# Patient Record
Sex: MALE | Race: WHITE
[De-identification: names, ages, dates, MRNs, and addresses within clinical notes are randomized per-mention and may not be internally consistent; named-entity substitution may affect disease eponyms.]

---

## 2017-08-08 ENCOUNTER — HOSPITAL ENCOUNTER (OUTPATIENT)
Dept: HOSPITAL 6 - LAB | Age: 47
End: 2017-08-08
Attending: FAMILY MEDICINE
Payer: MEDICARE

## 2017-08-08 VITALS
DIASTOLIC BLOOD PRESSURE: 90 MMHG | SYSTOLIC BLOOD PRESSURE: 136 MMHG | SYSTOLIC BLOOD PRESSURE: 136 MMHG | DIASTOLIC BLOOD PRESSURE: 90 MMHG | DIASTOLIC BLOOD PRESSURE: 90 MMHG | SYSTOLIC BLOOD PRESSURE: 136 MMHG

## 2017-08-08 VITALS — BODY MASS INDEX: 45.22 KG/M2 | HEIGHT: 69.02 IN | WEIGHT: 305.34 LBS

## 2017-08-08 DIAGNOSIS — R22.42: ICD-10-CM

## 2017-08-08 DIAGNOSIS — M79.662: Primary | ICD-10-CM

## 2017-08-08 DIAGNOSIS — R79.1: ICD-10-CM

## 2017-08-09 ENCOUNTER — HOSPITAL ENCOUNTER (OUTPATIENT)
Dept: HOSPITAL 6 - RAD | Age: 47
End: 2017-08-09
Attending: FAMILY MEDICINE
Payer: MEDICARE

## 2017-08-09 DIAGNOSIS — R79.1: Primary | ICD-10-CM

## 2017-08-09 DIAGNOSIS — M79.89: ICD-10-CM

## 2019-10-21 ENCOUNTER — HOSPITAL ENCOUNTER (OUTPATIENT)
Dept: HOSPITAL 6 - MSO | Age: 49
End: 2019-10-21
Payer: MEDICARE

## 2019-10-21 DIAGNOSIS — Z12.11: Primary | ICD-10-CM

## 2019-10-21 DIAGNOSIS — E66.9: ICD-10-CM

## 2019-10-21 DIAGNOSIS — I10: ICD-10-CM

## 2020-07-06 ENCOUNTER — HOSPITAL ENCOUNTER (EMERGENCY)
Dept: HOSPITAL 6 - ED | Age: 50
Discharge: HOME | End: 2020-07-06
Payer: MEDICARE

## 2020-07-06 VITALS — SYSTOLIC BLOOD PRESSURE: 161 MMHG | DIASTOLIC BLOOD PRESSURE: 94 MMHG

## 2020-07-06 DIAGNOSIS — T63.461A: Primary | ICD-10-CM

## 2020-07-06 DIAGNOSIS — Z23: ICD-10-CM

## 2020-07-06 DIAGNOSIS — I10: ICD-10-CM

## 2022-01-07 ENCOUNTER — HOSPITAL ENCOUNTER (EMERGENCY)
Dept: HOSPITAL 6 - ED | Age: 52
LOS: 1 days | Discharge: ANOTHER HOSPITAL | End: 2022-01-08
Payer: MEDICARE

## 2022-01-07 ENCOUNTER — HOSPITAL ENCOUNTER (INPATIENT)
Dept: HOSPITAL 19 - MEDICAL | Age: 52
LOS: 18 days | Discharge: NURSING, REHAB OR LONG-TERM CARE | DRG: 207 | End: 2022-01-25
Attending: STUDENT IN AN ORGANIZED HEALTH CARE EDUCATION/TRAINING PROGRAM | Admitting: STUDENT IN AN ORGANIZED HEALTH CARE EDUCATION/TRAINING PROGRAM
Payer: MEDICARE

## 2022-01-07 VITALS — WEIGHT: 315 LBS | BODY MASS INDEX: 46.65 KG/M2 | HEIGHT: 69.02 IN

## 2022-01-07 VITALS — HEIGHT: 69.02 IN | BODY MASS INDEX: 46.65 KG/M2 | WEIGHT: 315 LBS

## 2022-01-07 DIAGNOSIS — R05.9: Primary | ICD-10-CM
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
## 2022-01-07 DIAGNOSIS — D64.9: ICD-10-CM

## 2022-01-07 DIAGNOSIS — N39.0: ICD-10-CM

## 2022-01-07 DIAGNOSIS — Z73.0: ICD-10-CM

## 2022-01-07 DIAGNOSIS — U07.1: Primary | ICD-10-CM

## 2022-01-07 DIAGNOSIS — G40.909: ICD-10-CM

## 2022-01-07 DIAGNOSIS — E87.6: ICD-10-CM

## 2022-01-07 DIAGNOSIS — J12.82: ICD-10-CM

## 2022-01-07 DIAGNOSIS — J96.01: ICD-10-CM

## 2022-01-07 DIAGNOSIS — E66.01: ICD-10-CM

## 2022-01-07 DIAGNOSIS — J96.02: ICD-10-CM

## 2022-01-07 DIAGNOSIS — E87.5: ICD-10-CM

## 2022-01-07 DIAGNOSIS — N17.0: ICD-10-CM

## 2022-01-07 DIAGNOSIS — E87.0: ICD-10-CM

## 2022-01-07 DIAGNOSIS — E87.2: ICD-10-CM

## 2022-01-07 DIAGNOSIS — T82.49XA: ICD-10-CM

## 2022-01-07 DIAGNOSIS — I10: ICD-10-CM

## 2022-01-07 LAB
ALBUMIN SERPL-MCNC: 3.5 G/DL (ref 3.5–5)
ALT SERPL-CCNC: 27 U/L (ref 0–55)
AST SERPL-CCNC: 55 U/L (ref 5–34)
BASOPHILS # BLD: 0 K/MM3 (ref 0.02–0.1)
BILIRUB SERPL-MCNC: 0.4 MG/DL (ref 0.2–1.2)
CALCIUM SERPL-MCNC: 8.6 MG/DL (ref 8.3–10.5)
CO2 SERPL-SCNC: 29 MMOL/L (ref 22–29)
EOSINOPHIL # BLD: 0 K/MM3 (ref 0.04–0.4)
EOSINOPHIL NFR BLD: 0 % (ref 0–4)
ERYTHROCYTE [DISTWIDTH] IN BLOOD BY AUTOMATED COUNT: 13.6 % (ref 11.5–14.5)
GLUCOSE SERPL-MCNC: 130 MG/DL (ref 75–110)
HCT VFR BLD AUTO: 41.7 % (ref 42–52)
HGB BLD-MCNC: 13.5 G/DL (ref 13.5–18)
LYMPHOCYTES # BLD: 1.12 K/MM3 (ref 1.5–4)
MCH RBC QN AUTO: 28 PG (ref 27–31)
MCHC RBC AUTO-ENTMCNC: 32 G/DL (ref 33–37)
MCV RBC AUTO: 86 FL (ref 78–100)
MONOCYTES # BLD: 0.31 K/MM3 (ref 0.2–0.8)
NEUTROPHILS # BLD: 3.19 K/MM3 (ref 1.4–6.5)
PLATELET # BLD AUTO: 124 K/MM3 (ref 130–400)
PMV BLD AUTO: 9.3 FL (ref 7.4–10.4)
POTASSIUM SERPL-SCNC: 3.3 MMOL/L (ref 3.5–5.1)
PROT SERPL-MCNC: 7.8 G/DL (ref 6.4–8.3)
RBC # BLD AUTO: 4.88 M/MM3 (ref 4.2–5.6)
SODIUM SERPL-SCNC: 134 MMOL/L (ref 136–145)
WBC # BLD AUTO: 4.6 K/MM3 (ref 4.8–10.8)

## 2022-01-07 PROCEDURE — A7521 TRACH/LARYN TUBE CUFFED: HCPCS

## 2022-01-07 PROCEDURE — A4314 CATH W/DRAINAGE 2-WAY LATEX: HCPCS

## 2022-01-07 PROCEDURE — C1751 CATH, INF, PER/CENT/MIDLINE: HCPCS

## 2022-01-08 VITALS — DIASTOLIC BLOOD PRESSURE: 69 MMHG | SYSTOLIC BLOOD PRESSURE: 148 MMHG | HEART RATE: 97 BPM | TEMPERATURE: 98.2 F

## 2022-01-08 VITALS — SYSTOLIC BLOOD PRESSURE: 118 MMHG | DIASTOLIC BLOOD PRESSURE: 88 MMHG

## 2022-01-08 VITALS — HEART RATE: 93 BPM | SYSTOLIC BLOOD PRESSURE: 140 MMHG | TEMPERATURE: 98 F | DIASTOLIC BLOOD PRESSURE: 68 MMHG

## 2022-01-08 VITALS — TEMPERATURE: 99 F | DIASTOLIC BLOOD PRESSURE: 80 MMHG | SYSTOLIC BLOOD PRESSURE: 143 MMHG | HEART RATE: 92 BPM

## 2022-01-08 VITALS — HEART RATE: 92 BPM | SYSTOLIC BLOOD PRESSURE: 142 MMHG | DIASTOLIC BLOOD PRESSURE: 87 MMHG | TEMPERATURE: 97.3 F

## 2022-01-08 VITALS — DIASTOLIC BLOOD PRESSURE: 71 MMHG | TEMPERATURE: 98.2 F | HEART RATE: 92 BPM | SYSTOLIC BLOOD PRESSURE: 133 MMHG

## 2022-01-08 VITALS — HEART RATE: 70 BPM | SYSTOLIC BLOOD PRESSURE: 167 MMHG | DIASTOLIC BLOOD PRESSURE: 66 MMHG | TEMPERATURE: 98.4 F

## 2022-01-08 VITALS — TEMPERATURE: 99.1 F | HEART RATE: 93 BPM | DIASTOLIC BLOOD PRESSURE: 62 MMHG | SYSTOLIC BLOOD PRESSURE: 126 MMHG

## 2022-01-08 LAB
ALBUMIN SERPL-MCNC: 3.1 GM/DL (ref 3.5–5)
ALP SERPL-CCNC: 60 U/L (ref 40–150)
ALT SERPL-CCNC: 25 U/L (ref 0–55)
ANION GAP SERPL CALC-SCNC: 13 MMOL/L (ref 7–16)
AST SERPL-CCNC: 54 U/L (ref 5–34)
BASOPHILS # BLD: 0 K/MM3 (ref 0–0.2)
BASOPHILS NFR BLD AUTO: 0.2 % (ref 0–2)
BILIRUB SERPL-MCNC: 0.4 MG/DL (ref 0.2–1.2)
BUN SERPL-MCNC: 16 MG/DL (ref 8–26)
CALCIUM SERPL-MCNC: 8.5 MG/DL (ref 8.4–10.2)
CHLORIDE SERPL-SCNC: 95 MMOL/L (ref 98–107)
CO2 SERPL-SCNC: 31 MMOL/L (ref 22–29)
CREAT SERPL-SCNC: 0.9 MG/DL (ref 0.72–1.25)
EOSINOPHIL # BLD: 0 K/MM3 (ref 0–0.7)
EOSINOPHIL NFR BLD: 0 % (ref 0–4)
ERYTHROCYTE [DISTWIDTH] IN BLOOD BY AUTOMATED COUNT: 13.9 % (ref 11.5–14.5)
GLUCOSE SERPL-MCNC: 119 MG/DL (ref 70–99)
GRANULOCYTES # BLD AUTO: 68.9 % (ref 42.2–75.2)
HCT VFR BLD AUTO: 42.7 % (ref 42–52)
HGB BLD-MCNC: 13.4 G/DL (ref 13.5–18)
LYMPHOCYTES # BLD: 1.3 K/MM3 (ref 1.2–3.4)
LYMPHOCYTES NFR BLD: 24 % (ref 20–51)
MAGNESIUM SERPL-MCNC: 2.4 MG/DL (ref 1.6–2.6)
MCH RBC QN AUTO: 28 PG (ref 27–31)
MCHC RBC AUTO-ENTMCNC: 31 G/DL (ref 33–37)
MCV RBC AUTO: 88 FL (ref 80–100)
MONOCYTES # BLD: 0.3 K/MM3 (ref 0.1–0.6)
MONOCYTES NFR BLD AUTO: 6 % (ref 1.7–9.3)
NEUTROPHILS # BLD: 3.8 K/MM3 (ref 1.4–6.5)
PLATELET # BLD AUTO: 142 K/MM3 (ref 130–400)
PMV BLD AUTO: 9.9 FL (ref 7.4–10.4)
POTASSIUM SERPL-SCNC: 3.8 MMOL/L (ref 3.5–4.5)
PROT SERPL-MCNC: 7.7 GM/DL (ref 6.2–8.1)
RBC # BLD AUTO: 4.85 M/MM3 (ref 4.2–5.6)
SODIUM SERPL-SCNC: 139 MMOL/L (ref 136–145)

## 2022-01-08 PROCEDURE — XW033E5 INTRODUCTION OF REMDESIVIR ANTI-INFECTIVE INTO PERIPHERAL VEIN, PERCUTANEOUS APPROACH, NEW TECHNOLOGY GROUP 5: ICD-10-PCS | Performed by: STUDENT IN AN ORGANIZED HEALTH CARE EDUCATION/TRAINING PROGRAM

## 2022-01-08 NOTE — NUR
Patient assessed around 2015. Denies pain and discomfort. On oxygen at 6 L/min
via NC. Peripheral INT to left AC, on IV ABX. Voices no questions, needs, or
concerns at this time. In bed with call light within reach.

## 2022-01-08 NOTE — NUR
Patient had received IV ABX at [facility]. Called pharmacy and adjusted
medications accordingly. Patient denies pain and discomfort. Denies SOB and
dyspnea at rest, but does have dyspnea with exertion. Continues on oxygen at 6
L/min via NC. Voices no questions, needs, or concerns at this time. In bed
with call light within reach.

## 2022-01-08 NOTE — NUR
Pt. is on 6L O2 NC. O2 sat is 94%. Pt. resting in the chair at this time,
slightly drowsy, snoring noted. Pt. denies pain. AM abx have finished. Call
light and belongings in reach. Pt. waiting for lunch to arrive.

## 2022-01-08 NOTE — NUR
Patient arrived from [facility] via EMS at approximately 0010. Called
KERRI Maddox.

## 2022-01-08 NOTE — NUR
AM assessment complete. Pt. encouraged to brush teeth four times a day. Pt. is
on 6L O2 NC, O2 sat is 90-92%. Shallow breathing noted. Ice water provided,
pt. denies further needs at this time. Call light and belongings in reach. Pt.
reports coughing up sputum, this RN encouraged pt. to spit it out each time.

## 2022-01-08 NOTE — NUR
Pt. reports he has been expectorating thick brown mucus off and on throughout
the day. Pt. was given PRN robitussin for cough this afternoon, effect
pending. Pt. denies needs at this time, call light and belongings in reach.

## 2022-01-08 NOTE — NUR
NIYA called patient's niece to complete intake documentation Chantel
[phone]. Niece states that patient currently stays with her, he does not
utilize any DME and is pretty self sufficient she states. She provides that
she does not know who his PCP is, but uses [facility] for his pharmacy. Niece
provides that he does not have any one appointed as his DPOA-HC that she is
aware of. She also provides that as far as she knows his plan is to return
home up on dc. NIYA will continue to follow.
 
DC plan: home

## 2022-01-09 VITALS — HEART RATE: 81 BPM | TEMPERATURE: 98.1 F | DIASTOLIC BLOOD PRESSURE: 73 MMHG | SYSTOLIC BLOOD PRESSURE: 120 MMHG

## 2022-01-09 VITALS — SYSTOLIC BLOOD PRESSURE: 125 MMHG | HEART RATE: 87 BPM | DIASTOLIC BLOOD PRESSURE: 74 MMHG | TEMPERATURE: 98.1 F

## 2022-01-09 VITALS — DIASTOLIC BLOOD PRESSURE: 74 MMHG | HEART RATE: 79 BPM | SYSTOLIC BLOOD PRESSURE: 141 MMHG | TEMPERATURE: 98.1 F

## 2022-01-09 VITALS — TEMPERATURE: 97.8 F | HEART RATE: 86 BPM | SYSTOLIC BLOOD PRESSURE: 131 MMHG | DIASTOLIC BLOOD PRESSURE: 80 MMHG

## 2022-01-09 VITALS — SYSTOLIC BLOOD PRESSURE: 143 MMHG | DIASTOLIC BLOOD PRESSURE: 70 MMHG | TEMPERATURE: 98.6 F | HEART RATE: 94 BPM

## 2022-01-09 VITALS — SYSTOLIC BLOOD PRESSURE: 134 MMHG | TEMPERATURE: 98.4 F | HEART RATE: 91 BPM | DIASTOLIC BLOOD PRESSURE: 60 MMHG

## 2022-01-09 LAB
ANION GAP SERPL CALC-SCNC: 13 MMOL/L (ref 7–16)
BUN SERPL-MCNC: 15 MG/DL (ref 8–26)
CALCIUM SERPL-MCNC: 8.6 MG/DL (ref 8.4–10.2)
CHLORIDE SERPL-SCNC: 95 MMOL/L (ref 98–107)
CO2 SERPL-SCNC: 27 MMOL/L (ref 22–29)
CREAT SERPL-SCNC: 0.83 MG/DL (ref 0.72–1.25)
ERYTHROCYTE [DISTWIDTH] IN BLOOD BY AUTOMATED COUNT: 13.8 % (ref 11.5–14.5)
GLUCOSE SERPL-MCNC: 209 MG/DL (ref 70–99)
HCT VFR BLD AUTO: 40.9 % (ref 42–52)
HGB BLD-MCNC: 12.9 G/DL (ref 13.5–18)
HYPOCHROMIA BLD QL SMEAR: (no result)
LYMPHOCYTES NFR BLD MANUAL: 26 % (ref 20–51)
MCH RBC QN AUTO: 27 PG (ref 27–31)
MCHC RBC AUTO-ENTMCNC: 32 G/DL (ref 33–37)
MCV RBC AUTO: 86 FL (ref 80–100)
MONOCYTES NFR BLD: 5 % (ref 1.7–9.3)
NEUTS SEG NFR BLD MANUAL: 69 % (ref 42–75.2)
PLATELET # BLD AUTO: 169 K/MM3 (ref 130–400)
PLATELET BLD QL SMEAR: NORMAL
PMV BLD AUTO: 9.7 FL (ref 7.4–10.4)
POTASSIUM SERPL-SCNC: 3.4 MMOL/L (ref 3.5–4.5)
RBC # BLD AUTO: 4.76 M/MM3 (ref 4.2–5.6)
SODIUM SERPL-SCNC: 135 MMOL/L (ref 136–145)

## 2022-01-09 NOTE — NUR
Patient has denied having pain and discomfort. Voices no questions, needs, or
concerns at this time. Continues on oxygen at 6 L/min via NC.

## 2022-01-09 NOTE — NUR
THE PATIENT HAS SOME YEAST LOOKING REDNESS UNDER THE RIGHT ARM. WILL ASK DR. MOTT FOR SOME NISTATIN CREAM. ALSO THE PATIENT'S BOWELS ARE HYPOACTIVE AND
THE PATIENT MAY NEED TO START A BOWEL REGIMEN.

## 2022-01-09 NOTE — NUR
PT DENIES ANY ISSUES AT THIS TIME. STATES THAT HE IS FEELING MUCH BETTER. IS
ABLE TO TELL ME ABOUT HIMSELF, WHERE HE IS FROM. A&OX4. PT IS INDEPENDENT,
HOWEVER, WAITS UNTIL HIS IV ABX ARE COMPLETE BEFORE GETTING UP. HE DENIES ANY
NEEDS. PT EATING BREAKFAST WELL. NO OTHER CONCERNS.

## 2022-01-10 VITALS — DIASTOLIC BLOOD PRESSURE: 95 MMHG | HEART RATE: 81 BPM | SYSTOLIC BLOOD PRESSURE: 118 MMHG | TEMPERATURE: 97.9 F

## 2022-01-10 VITALS — HEART RATE: 86 BPM | TEMPERATURE: 97.5 F | DIASTOLIC BLOOD PRESSURE: 92 MMHG | SYSTOLIC BLOOD PRESSURE: 130 MMHG

## 2022-01-10 VITALS — HEART RATE: 81 BPM | SYSTOLIC BLOOD PRESSURE: 121 MMHG | DIASTOLIC BLOOD PRESSURE: 78 MMHG | TEMPERATURE: 97.2 F

## 2022-01-10 VITALS — SYSTOLIC BLOOD PRESSURE: 127 MMHG | HEART RATE: 86 BPM | TEMPERATURE: 97.7 F | DIASTOLIC BLOOD PRESSURE: 79 MMHG

## 2022-01-10 VITALS — SYSTOLIC BLOOD PRESSURE: 128 MMHG | TEMPERATURE: 97.9 F | DIASTOLIC BLOOD PRESSURE: 64 MMHG | HEART RATE: 75 BPM

## 2022-01-10 VITALS — TEMPERATURE: 97.6 F | SYSTOLIC BLOOD PRESSURE: 123 MMHG | HEART RATE: 81 BPM | DIASTOLIC BLOOD PRESSURE: 77 MMHG

## 2022-01-10 LAB
ALBUMIN SERPL-MCNC: 3 GM/DL (ref 3.5–5)
ALP SERPL-CCNC: 66 U/L (ref 40–150)
ALT SERPL-CCNC: 29 U/L (ref 0–55)
ANION GAP SERPL CALC-SCNC: 14 MMOL/L (ref 7–16)
AST SERPL-CCNC: 37 U/L (ref 5–34)
BILIRUB SERPL-MCNC: 0.3 MG/DL (ref 0.2–1.2)
BUN SERPL-MCNC: 16 MG/DL (ref 8–26)
CALCIUM SERPL-MCNC: 9.2 MG/DL (ref 8.4–10.2)
CHLORIDE SERPL-SCNC: 94 MMOL/L (ref 98–107)
CO2 SERPL-SCNC: 29 MMOL/L (ref 22–29)
CREAT SERPL-SCNC: 0.8 MG/DL (ref 0.72–1.25)
GLUCOSE SERPL-MCNC: 103 MG/DL (ref 70–99)
POTASSIUM SERPL-SCNC: 3.5 MMOL/L (ref 3.5–4.5)
PROT SERPL-MCNC: 7.8 GM/DL (ref 6.2–8.1)
SODIUM SERPL-SCNC: 137 MMOL/L (ref 136–145)

## 2022-01-10 NOTE — NUR
PT HAD UNEVENTFUL NIGHT THIS SHIFT. PT REMAINS ON 6L NC. AFEBRILE, VSS. ALL
NEEDS MET THIS SHIFT. PT HAD ONE EPISODE OF HEMOPTYSIS. SCANT DARK RED BLOOD.
PT PROVIDED MEDICATIONS, ABX ADMINISTERED AS ORDERED. ALL NEEDS MET THIS
SHIFT. CALL LIGHT WITHIN REACH.

## 2022-01-11 VITALS — SYSTOLIC BLOOD PRESSURE: 123 MMHG | DIASTOLIC BLOOD PRESSURE: 69 MMHG | HEART RATE: 85 BPM | TEMPERATURE: 97.8 F

## 2022-01-11 VITALS — TEMPERATURE: 98.3 F | HEART RATE: 83 BPM | SYSTOLIC BLOOD PRESSURE: 132 MMHG | DIASTOLIC BLOOD PRESSURE: 72 MMHG

## 2022-01-11 VITALS — DIASTOLIC BLOOD PRESSURE: 75 MMHG | TEMPERATURE: 97.9 F | SYSTOLIC BLOOD PRESSURE: 124 MMHG | HEART RATE: 80 BPM

## 2022-01-11 VITALS — HEART RATE: 87 BPM | SYSTOLIC BLOOD PRESSURE: 126 MMHG | DIASTOLIC BLOOD PRESSURE: 71 MMHG | TEMPERATURE: 97.4 F

## 2022-01-11 VITALS — TEMPERATURE: 97.6 F | HEART RATE: 87 BPM | DIASTOLIC BLOOD PRESSURE: 74 MMHG | SYSTOLIC BLOOD PRESSURE: 120 MMHG

## 2022-01-11 VITALS — DIASTOLIC BLOOD PRESSURE: 70 MMHG | HEART RATE: 90 BPM | TEMPERATURE: 99.5 F | SYSTOLIC BLOOD PRESSURE: 113 MMHG

## 2022-01-11 PROCEDURE — 5A0935A ASSISTANCE WITH RESPIRATORY VENTILATION, LESS THAN 24 CONSECUTIVE HOURS, HIGH NASAL FLOW/VELOCITY: ICD-10-PCS | Performed by: STUDENT IN AN ORGANIZED HEALTH CARE EDUCATION/TRAINING PROGRAM

## 2022-01-11 NOTE — NUR
An exercise oximetry was ordered today and the patient needed 12 liters with
ambulation. SW to continue to monitor.

## 2022-01-12 VITALS — OXYGEN SATURATION: 95 %

## 2022-01-12 VITALS — OXYGEN SATURATION: 100 %

## 2022-01-12 VITALS — OXYGEN SATURATION: 96 %

## 2022-01-12 VITALS — OXYGEN SATURATION: 99 %

## 2022-01-12 VITALS — TEMPERATURE: 98.1 F | SYSTOLIC BLOOD PRESSURE: 134 MMHG | HEART RATE: 98 BPM | DIASTOLIC BLOOD PRESSURE: 74 MMHG

## 2022-01-12 VITALS — OXYGEN SATURATION: 98 %

## 2022-01-12 VITALS — OXYGEN SATURATION: 94 %

## 2022-01-12 VITALS — TEMPERATURE: 98.3 F | HEART RATE: 100 BPM | DIASTOLIC BLOOD PRESSURE: 79 MMHG | SYSTOLIC BLOOD PRESSURE: 123 MMHG

## 2022-01-12 VITALS — SYSTOLIC BLOOD PRESSURE: 149 MMHG | TEMPERATURE: 97.6 F | DIASTOLIC BLOOD PRESSURE: 65 MMHG | HEART RATE: 115 BPM

## 2022-01-12 VITALS — TEMPERATURE: 98.9 F | SYSTOLIC BLOOD PRESSURE: 131 MMHG | DIASTOLIC BLOOD PRESSURE: 35 MMHG | HEART RATE: 79 BPM

## 2022-01-12 VITALS — OXYGEN SATURATION: 93 %

## 2022-01-12 VITALS — OXYGEN SATURATION: 88 %

## 2022-01-12 VITALS — DIASTOLIC BLOOD PRESSURE: 72 MMHG | TEMPERATURE: 98.3 F | SYSTOLIC BLOOD PRESSURE: 117 MMHG | HEART RATE: 87 BPM

## 2022-01-12 VITALS — TEMPERATURE: 98.3 F | DIASTOLIC BLOOD PRESSURE: 69 MMHG | HEART RATE: 95 BPM | SYSTOLIC BLOOD PRESSURE: 127 MMHG

## 2022-01-12 VITALS — OXYGEN SATURATION: 92 %

## 2022-01-12 VITALS — OXYGEN SATURATION: 91 %

## 2022-01-12 VITALS — OXYGEN SATURATION: 89 %

## 2022-01-12 VITALS — HEART RATE: 115 BPM

## 2022-01-12 VITALS — OXYGEN SATURATION: 78 %

## 2022-01-12 VITALS — OXYGEN SATURATION: 97 %

## 2022-01-12 LAB
ALBUMIN SERPL-MCNC: 3.1 GM/DL (ref 3.5–5)
ALP SERPL-CCNC: 72 U/L (ref 40–150)
ALT SERPL-CCNC: 35 U/L (ref 0–55)
ANION GAP SERPL CALC-SCNC: 14 MMOL/L (ref 7–16)
AST SERPL-CCNC: 38 U/L (ref 5–34)
BASE EXCESS BLDA CALC-SCNC: -13.9 MMOL/L (ref -2–2)
BILIRUB SERPL-MCNC: 0.5 MG/DL (ref 0.2–1.2)
BUN SERPL-MCNC: 17 MG/DL (ref 8–26)
CALCIUM SERPL-MCNC: 9 MG/DL (ref 8.4–10.2)
CHLORIDE SERPL-SCNC: 95 MMOL/L (ref 98–107)
CO2 BLDA-SCNC: 17.6 MMOL/L
CO2 SERPL-SCNC: 27 MMOL/L (ref 22–29)
CREAT SERPL-SCNC: 0.77 MG/DL (ref 0.72–1.25)
GLUCOSE SERPL-MCNC: 98 MG/DL (ref 70–99)
HCO3 BLDA-SCNC: 16 MEQ/L (ref 22–26)
PCO2 BLDA: 52.7 MMHG (ref 35–45)
PO2 BLDA: 111.3 MMHG (ref 80–100)
POTASSIUM SERPL-SCNC: 3.9 MMOL/L (ref 3.5–4.5)
PROT SERPL-MCNC: 8 GM/DL (ref 6.2–8.1)
SAO2 % BLDA: 96.4 % (ref 92–100)
SODIUM SERPL-SCNC: 136 MMOL/L (ref 136–145)

## 2022-01-12 PROCEDURE — 0BH17EZ INSERTION OF ENDOTRACHEAL AIRWAY INTO TRACHEA, VIA NATURAL OR ARTIFICIAL OPENING: ICD-10-PCS | Performed by: STUDENT IN AN ORGANIZED HEALTH CARE EDUCATION/TRAINING PROGRAM

## 2022-01-12 PROCEDURE — 05HM33Z INSERTION OF INFUSION DEVICE INTO RIGHT INTERNAL JUGULAR VEIN, PERCUTANEOUS APPROACH: ICD-10-PCS | Performed by: STUDENT IN AN ORGANIZED HEALTH CARE EDUCATION/TRAINING PROGRAM

## 2022-01-12 PROCEDURE — 5A1955Z RESPIRATORY VENTILATION, GREATER THAN 96 CONSECUTIVE HOURS: ICD-10-PCS | Performed by: STUDENT IN AN ORGANIZED HEALTH CARE EDUCATION/TRAINING PROGRAM

## 2022-01-12 NOTE — NUR
1840 ETT secured 25cm at the Teeth, color change observed, bilateral breath
sounds auscultated. NG tube inserted through right nare at 70cm.
1900 MD Fran in room, right subclavian tripple lumen central line inserted,
bloody ooze observed, manual pressure apllied. Versed and Fentanyl gtts
titrated per MD Fran while in room to 10mg/hr Versed & 175mcg/hr Fentanyl
1912 Rad Tech present for line and tube placement confirmation

## 2022-01-12 NOTE — NUR
SHIFT REPORT RECEIVED FROM MAKAYLA GAMA PT FOUND BY RT KNEELING ON THR FLOOR
WITH NO OXYGEN. PT CHECK BY RT AND SEEM TO BE DOING FINE. PT INSTRUCTED TO
KEEP OXYGNE ON AT ALL TIME

## 2022-01-12 NOTE — NUR
MD Fran informed staff: emergent intubation required for medical necessity,
family can be updated after airway is protected.

## 2022-01-12 NOTE — NUR
AT 1545 PT WALKING TO THE DOOR HOLDING HIS BELONGINGS WITH NO OXYGEN ON. WHEN
ASK PT WHERE H WAS GOING STATED "I'M GOING HOME"
PT ASKED TO GO BACK TO ROOM BY
THIS NURSE, WITHIN TWO MINUTES PT NOTED SITTING IN CHAIR UNRESPONSIVE LIPS
CYANOTIC ON ASSESSEMENT OXGEN SATURATION 65% ON RA PT PLACED ON 10L HF. CHARGE
AMMY,ICU CHARGE NURSE NOTIFIED AND DR CURRIE NOTIFIED. ORDERS FOR ELG ANG ABG
RECEIVED FROM DR MOTT. PT SISTER MARCIANO CALLED AND UPADTED

## 2022-01-12 NOTE — NUR
PT TRANSFERED TO ICU VIA BED AT 1745. ALL PERSONAL BELOMGINGS SEND WITH PT.
PT SISTER MARCIANO NOTIFIED

## 2022-01-12 NOTE — NUR
The patient's RN reports that they called a CAT call on the patient. SW
contacted the patient's niece, Chantel (ph#[phone]), to confirm next of
next. Chantel reports that the patient is not  and has no children.
Chantel reports that the patient has three siblings: Christina Alonzo
(ph#[phone], [place]), Tyrell Rosado ([place]), and Patricio Rosado. His
three siblings are the patient's next of kin.

## 2022-01-12 NOTE — NUR
JESSIE Peters at bedside - pt obtunded unable to open eyes or follow commands.
Anesthesia paged for intubation - RT Sulma notified

## 2022-01-12 NOTE — NUR
PT ARRIVES TO ICU 7 VIA BED ON 10L OM. PT TRANSFERRED TO ICU BED VIA SLIDE
BOARD AND X3 ASSIST. PT UNABLE TO HELP STAFF WITH TRANSFER. PLACED ON BEDSIDE
CONTINUOUS MONITOR. VSS. PT UNABLE TO OPEN EYES UPON COMMANDS BUT DOES MOAN
OCCASSIONALLY TO PAINFUL STIMULI.

## 2022-01-13 VITALS — OXYGEN SATURATION: 100 %

## 2022-01-13 VITALS — DIASTOLIC BLOOD PRESSURE: 88 MMHG | SYSTOLIC BLOOD PRESSURE: 136 MMHG | HEART RATE: 80 BPM

## 2022-01-13 VITALS — DIASTOLIC BLOOD PRESSURE: 76 MMHG | OXYGEN SATURATION: 100 % | SYSTOLIC BLOOD PRESSURE: 120 MMHG | HEART RATE: 75 BPM

## 2022-01-13 VITALS — OXYGEN SATURATION: 99 %

## 2022-01-13 VITALS — TEMPERATURE: 97.7 F | SYSTOLIC BLOOD PRESSURE: 120 MMHG | HEART RATE: 73 BPM | DIASTOLIC BLOOD PRESSURE: 76 MMHG

## 2022-01-13 VITALS — HEART RATE: 79 BPM | SYSTOLIC BLOOD PRESSURE: 139 MMHG | DIASTOLIC BLOOD PRESSURE: 80 MMHG | TEMPERATURE: 98.2 F

## 2022-01-13 VITALS — OXYGEN SATURATION: 98 %

## 2022-01-13 VITALS — OXYGEN SATURATION: 93 %

## 2022-01-13 VITALS — SYSTOLIC BLOOD PRESSURE: 127 MMHG | HEART RATE: 84 BPM | DIASTOLIC BLOOD PRESSURE: 78 MMHG | TEMPERATURE: 97.8 F

## 2022-01-13 VITALS
HEART RATE: 80 BPM | OXYGEN SATURATION: 100 % | DIASTOLIC BLOOD PRESSURE: 77 MMHG | TEMPERATURE: 97.7 F | SYSTOLIC BLOOD PRESSURE: 119 MMHG

## 2022-01-13 VITALS — DIASTOLIC BLOOD PRESSURE: 72 MMHG | SYSTOLIC BLOOD PRESSURE: 112 MMHG | TEMPERATURE: 97.5 F | HEART RATE: 74 BPM

## 2022-01-13 VITALS — OXYGEN SATURATION: 97 %

## 2022-01-13 VITALS — SYSTOLIC BLOOD PRESSURE: 120 MMHG | DIASTOLIC BLOOD PRESSURE: 76 MMHG | HEART RATE: 75 BPM

## 2022-01-13 VITALS — SYSTOLIC BLOOD PRESSURE: 135 MMHG | DIASTOLIC BLOOD PRESSURE: 88 MMHG | TEMPERATURE: 97.5 F | HEART RATE: 83 BPM

## 2022-01-13 VITALS — OXYGEN SATURATION: 86 %

## 2022-01-13 LAB
ALBUMIN SERPL-MCNC: 2.8 GM/DL (ref 3.5–5)
ALP SERPL-CCNC: 67 U/L (ref 40–150)
ALT SERPL-CCNC: 31 U/L (ref 0–55)
ANION GAP SERPL CALC-SCNC: 15 MMOL/L (ref 7–16)
AST SERPL-CCNC: 37 U/L (ref 5–34)
BASE EXCESS BLDA CALC-SCNC: 1.4 MMOL/L (ref -2–2)
BASE EXCESS BLDA CALC-SCNC: 2.1 MMOL/L (ref -2–2)
BASE EXCESS BLDA CALC-SCNC: 2.8 MMOL/L (ref -2–2)
BASOPHILS # BLD: 0 K/MM3 (ref 0–0.2)
BASOPHILS NFR BLD AUTO: 0.3 % (ref 0–2)
BILIRUB SERPL-MCNC: 0.5 MG/DL (ref 0.2–1.2)
BUN SERPL-MCNC: 27 MG/DL (ref 8–26)
CALCIUM SERPL-MCNC: 8.7 MG/DL (ref 8.4–10.2)
CHLORIDE SERPL-SCNC: 98 MMOL/L (ref 98–107)
CO2 BLDA-SCNC: 26.6 MMOL/L
CO2 BLDA-SCNC: 27.8 MMOL/L
CO2 BLDA-SCNC: 28.1 MMOL/L
CO2 SERPL-SCNC: 25 MMOL/L (ref 22–29)
CREAT SERPL-SCNC: 1.51 MG/DL (ref 0.72–1.25)
EOSINOPHIL # BLD: 0 K/MM3 (ref 0–0.7)
EOSINOPHIL NFR BLD: 0.3 % (ref 0–4)
ERYTHROCYTE [DISTWIDTH] IN BLOOD BY AUTOMATED COUNT: 13.3 % (ref 11.5–14.5)
GLUCOSE SERPL-MCNC: 119 MG/DL (ref 70–99)
GRANULOCYTES # BLD AUTO: 70.2 % (ref 42.2–75.2)
HCO3 BLDA-SCNC: 25.5 MEQ/L (ref 22–26)
HCO3 BLDA-SCNC: 26.6 MEQ/L (ref 22–26)
HCO3 BLDA-SCNC: 26.8 MEQ/L (ref 22–26)
HCT VFR BLD AUTO: 38.1 % (ref 42–52)
HGB BLD-MCNC: 12.5 G/DL (ref 13.5–18)
INHALED O2 CONCENTRATION: 50 %
LYMPHOCYTES # BLD: 1.4 K/MM3 (ref 1.2–3.4)
LYMPHOCYTES NFR BLD: 18 % (ref 20–51)
MAGNESIUM SERPL-MCNC: 2.8 MG/DL (ref 1.6–2.6)
MCH RBC QN AUTO: 27 PG (ref 27–31)
MCHC RBC AUTO-ENTMCNC: 33 G/DL (ref 33–37)
MCV RBC AUTO: 83 FL (ref 80–100)
MONOCYTES # BLD: 0.8 K/MM3 (ref 0.1–0.6)
MONOCYTES NFR BLD AUTO: 9.9 % (ref 1.7–9.3)
NEUTROPHILS # BLD: 5.4 K/MM3 (ref 1.4–6.5)
PCO2 BLDA: 35.9 MMHG (ref 35–45)
PCO2 BLDA: 38.2 MMHG (ref 35–45)
PCO2 BLDA: 45 MMHG (ref 35–45)
PLATELET # BLD AUTO: 281 K/MM3 (ref 130–400)
PMV BLD AUTO: 9.6 FL (ref 7.4–10.4)
PO2 BLDA: 88.9 MMHG (ref 80–100)
PO2 BLDA: 91.8 MMHG (ref 80–100)
PO2 BLDA: 93.4 MMHG (ref 80–100)
POTASSIUM SERPL-SCNC: 3.8 MMOL/L (ref 3.5–4.5)
PROT SERPL-MCNC: 7.3 GM/DL (ref 6.2–8.1)
RBC # BLD AUTO: 4.58 M/MM3 (ref 4.2–5.6)
SAO2 % BLDA: 96.9 % (ref 92–100)
SAO2 % BLDA: 97.3 % (ref 92–100)
SAO2 % BLDA: 97.5 % (ref 92–100)
SODIUM SERPL-SCNC: 138 MMOL/L (ref 136–145)

## 2022-01-13 NOTE — NUR
Patient Opens eyes slightly on command and squeezed this nurses's left hand.
Not following other commands at this time.  Patient has been observed moving
all for extremities in response to painful stimuli.

## 2022-01-13 NOTE — NUR
Continues to have very minimal urine output despite initiating fluids.  Dr. Bell notified; will increase NS to 100 ml/hr.

## 2022-01-13 NOTE — NUR
Patient has had minimal urine output averaging less than 30 ml/hr.  Discussed
with Dr. Bell and will initiate fluids. Will continue to monitor.

## 2022-01-14 VITALS — OXYGEN SATURATION: 100 %

## 2022-01-14 VITALS — OXYGEN SATURATION: 96 %

## 2022-01-14 VITALS — OXYGEN SATURATION: 99 %

## 2022-01-14 VITALS — OXYGEN SATURATION: 97 %

## 2022-01-14 VITALS
SYSTOLIC BLOOD PRESSURE: 169 MMHG | OXYGEN SATURATION: 100 % | HEART RATE: 93 BPM | DIASTOLIC BLOOD PRESSURE: 105 MMHG | TEMPERATURE: 98.7 F

## 2022-01-14 VITALS — DIASTOLIC BLOOD PRESSURE: 103 MMHG | HEART RATE: 93 BPM | SYSTOLIC BLOOD PRESSURE: 167 MMHG | TEMPERATURE: 97.8 F

## 2022-01-14 VITALS — TEMPERATURE: 98.3 F | HEART RATE: 81 BPM | SYSTOLIC BLOOD PRESSURE: 147 MMHG | DIASTOLIC BLOOD PRESSURE: 90 MMHG

## 2022-01-14 VITALS
HEART RATE: 96 BPM | TEMPERATURE: 99.1 F | DIASTOLIC BLOOD PRESSURE: 103 MMHG | OXYGEN SATURATION: 100 % | SYSTOLIC BLOOD PRESSURE: 161 MMHG

## 2022-01-14 VITALS — DIASTOLIC BLOOD PRESSURE: 103 MMHG | SYSTOLIC BLOOD PRESSURE: 169 MMHG | HEART RATE: 95 BPM

## 2022-01-14 VITALS
DIASTOLIC BLOOD PRESSURE: 96 MMHG | HEART RATE: 115 BPM | OXYGEN SATURATION: 100 % | TEMPERATURE: 98.2 F | SYSTOLIC BLOOD PRESSURE: 164 MMHG

## 2022-01-14 VITALS — TEMPERATURE: 98 F | SYSTOLIC BLOOD PRESSURE: 176 MMHG | HEART RATE: 100 BPM | DIASTOLIC BLOOD PRESSURE: 94 MMHG

## 2022-01-14 VITALS — OXYGEN SATURATION: 98 %

## 2022-01-14 VITALS — DIASTOLIC BLOOD PRESSURE: 103 MMHG | SYSTOLIC BLOOD PRESSURE: 159 MMHG | HEART RATE: 92 BPM

## 2022-01-14 VITALS — OXYGEN SATURATION: 92 %

## 2022-01-14 VITALS — DIASTOLIC BLOOD PRESSURE: 100 MMHG | HEART RATE: 84 BPM | SYSTOLIC BLOOD PRESSURE: 157 MMHG

## 2022-01-14 VITALS — OXYGEN SATURATION: 95 %

## 2022-01-14 VITALS — OXYGEN SATURATION: 83 %

## 2022-01-14 VITALS — OXYGEN SATURATION: 85 %

## 2022-01-14 VITALS — OXYGEN SATURATION: 93 %

## 2022-01-14 VITALS — OXYGEN SATURATION: 84 %

## 2022-01-14 VITALS — OXYGEN SATURATION: 65 %

## 2022-01-14 VITALS — OXYGEN SATURATION: 86 %

## 2022-01-14 VITALS — OXYGEN SATURATION: 88 %

## 2022-01-14 VITALS — OXYGEN SATURATION: 77 %

## 2022-01-14 LAB
ANION GAP SERPL CALC-SCNC: 14 MMOL/L (ref 7–16)
ANION GAP SERPL CALC-SCNC: 15 MMOL/L (ref 7–16)
ANISOCYTOSIS BLD QL: (no result)
BASE EXCESS BLDA CALC-SCNC: -2.9 MMOL/L (ref -2–2)
BASE EXCESS BLDA CALC-SCNC: -4.6 MMOL/L (ref -2–2)
BUN SERPL-MCNC: 45 MG/DL (ref 8–26)
BUN SERPL-MCNC: 54 MG/DL (ref 8–26)
CALCIUM SERPL-MCNC: 7.5 MG/DL (ref 8.4–10.2)
CALCIUM SERPL-MCNC: 7.9 MG/DL (ref 8.4–10.2)
CHLORIDE SERPL-SCNC: 102 MMOL/L (ref 98–107)
CHLORIDE SERPL-SCNC: 103 MMOL/L (ref 98–107)
CO2 BLDA-SCNC: 21.9 MMOL/L
CO2 BLDA-SCNC: 24.7 MMOL/L
CO2 SERPL-SCNC: 20 MMOL/L (ref 22–29)
CO2 SERPL-SCNC: 21 MMOL/L (ref 22–29)
CREAT SERPL-SCNC: 3.55 MG/DL (ref 0.72–1.25)
CREAT SERPL-SCNC: 4.38 MG/DL (ref 0.72–1.25)
ERYTHROCYTE [DISTWIDTH] IN BLOOD BY AUTOMATED COUNT: 14 % (ref 11.5–14.5)
GLUCOSE SERPL-MCNC: 124 MG/DL (ref 70–99)
GLUCOSE SERPL-MCNC: 155 MG/DL (ref 70–99)
GLUCOSE UR QL STRIP.AUTO: (no result)
HCO3 BLDA-SCNC: 20.7 MEQ/L (ref 22–26)
HCO3 BLDA-SCNC: 23.3 MEQ/L (ref 22–26)
HCT VFR BLD AUTO: 37 % (ref 42–52)
HGB BLD-MCNC: 11.7 G/DL (ref 13.5–18)
HYPOCHROMIA BLD QL SMEAR: (no result)
INHALED O2 CONCENTRATION: 35 %
INHALED O2 CONCENTRATION: 40 %
LYMPHOCYTES NFR BLD MANUAL: 18 % (ref 20–51)
MCH RBC QN AUTO: 28 PG (ref 27–31)
MCHC RBC AUTO-ENTMCNC: 32 G/DL (ref 33–37)
MCV RBC AUTO: 87 FL (ref 80–100)
MONOCYTES NFR BLD: 11 % (ref 1.7–9.3)
NEUTS BAND NFR BLD: 5 % (ref 0–10)
NEUTS SEG NFR BLD MANUAL: 66 % (ref 42–75.2)
NRBC BLD AUTO-RTO: 1 % (ref 0–6)
PCO2 BLDA: 39 MMHG (ref 35–45)
PCO2 BLDA: 45.9 MMHG (ref 35–45)
PH UR STRIP.AUTO: 5 [PH] (ref 5–8)
PLATELET # BLD AUTO: 266 K/MM3 (ref 130–400)
PMV BLD AUTO: 9.4 FL (ref 7.4–10.4)
PO2 BLDA: 69.7 MMHG (ref 80–100)
PO2 BLDA: 90.3 MMHG (ref 80–100)
POTASSIUM SERPL-SCNC: 4.3 MMOL/L (ref 3.5–4.5)
POTASSIUM SERPL-SCNC: 5.5 MMOL/L (ref 3.5–4.5)
RBC # BLD AUTO: 4.25 M/MM3 (ref 4.2–5.6)
RBC # UR STRIP.AUTO: (no result) /UL
RBC # UR: >50 /HPF (ref 0–2)
SAO2 % BLDA: 93.5 % (ref 92–100)
SAO2 % BLDA: 96.1 % (ref 92–100)
SODIUM SERPL-SCNC: 137 MMOL/L (ref 136–145)
SODIUM SERPL-SCNC: 138 MMOL/L (ref 136–145)
SP GR UR STRIP.AUTO: 1.01 (ref 1–1.03)
SQUAMOUS # URNS: (no result) /HPF (ref 0–10)
UA DIPSTICK PNL UR STRIP.AUTO: (no result)
URN COLLECT METHOD CLASS: (no result)

## 2022-01-14 NOTE — NUR
Sedation was completely turned off this am and patient became extremely
agitated.  Sedation not turned off again this evening as patient easily
arouses and becomes agitated with minimal stimulation.

## 2022-01-14 NOTE — NUR
Assessment complete and charted. Patient agitated and fighting vent.
Attempting to pull ET tube out. Obtained order from Dr. Esquivel for precedex
and started precedex. Patient does not follow commands, will not answer
questions at this time.

## 2022-01-14 NOTE — NUR
Patient placed on a weaning trial and all sedation placed on standby.  Quickly
became extremely agitated; pulling at restraints/gown and monitoring lines.
Was not able to be calmed down with verbal re-assurance and not following any
commands.  Discussed with Dr. Peters and will place back on all sedation and
return to previous vent settings.

## 2022-01-14 NOTE — NUR
REMY Pratt notified about latest BMP results and patient's elevated BP
readings. Awaiting further orders.

## 2022-01-14 NOTE — NUR
Sedation was turned back on and became slightly agitated again.  Able to
attempt to open eyes on command. Also squeezed hands and shook head "yes" and
"no" appropriately to simple questions. Dr. Peters notified, however, will not
re-attempt extubation today due to increased acidosis and low urine output.

## 2022-01-15 VITALS — OXYGEN SATURATION: 100 %

## 2022-01-15 VITALS — OXYGEN SATURATION: 99 %

## 2022-01-15 VITALS — OXYGEN SATURATION: 97 %

## 2022-01-15 VITALS — OXYGEN SATURATION: 98 %

## 2022-01-15 VITALS — HEART RATE: 87 BPM | DIASTOLIC BLOOD PRESSURE: 82 MMHG | SYSTOLIC BLOOD PRESSURE: 123 MMHG | TEMPERATURE: 99 F

## 2022-01-15 VITALS
HEART RATE: 94 BPM | SYSTOLIC BLOOD PRESSURE: 132 MMHG | DIASTOLIC BLOOD PRESSURE: 77 MMHG | OXYGEN SATURATION: 97 % | TEMPERATURE: 98.7 F

## 2022-01-15 VITALS
HEART RATE: 85 BPM | TEMPERATURE: 99 F | OXYGEN SATURATION: 99 % | DIASTOLIC BLOOD PRESSURE: 82 MMHG | SYSTOLIC BLOOD PRESSURE: 135 MMHG

## 2022-01-15 VITALS — DIASTOLIC BLOOD PRESSURE: 80 MMHG | HEART RATE: 81 BPM | SYSTOLIC BLOOD PRESSURE: 121 MMHG | TEMPERATURE: 99.2 F

## 2022-01-15 VITALS — HEART RATE: 93 BPM | SYSTOLIC BLOOD PRESSURE: 125 MMHG | DIASTOLIC BLOOD PRESSURE: 76 MMHG | TEMPERATURE: 99.2 F

## 2022-01-15 VITALS — OXYGEN SATURATION: 95 %

## 2022-01-15 VITALS — OXYGEN SATURATION: 94 %

## 2022-01-15 VITALS — OXYGEN SATURATION: 96 %

## 2022-01-15 VITALS
HEART RATE: 89 BPM | DIASTOLIC BLOOD PRESSURE: 77 MMHG | SYSTOLIC BLOOD PRESSURE: 124 MMHG | OXYGEN SATURATION: 97 % | TEMPERATURE: 98.8 F

## 2022-01-15 VITALS — OXYGEN SATURATION: 93 %

## 2022-01-15 VITALS — OXYGEN SATURATION: 87 %

## 2022-01-15 VITALS — OXYGEN SATURATION: 92 %

## 2022-01-15 VITALS — OXYGEN SATURATION: 83 %

## 2022-01-15 LAB
ANION GAP SERPL CALC-SCNC: 14 MMOL/L (ref 7–16)
ANION GAP SERPL CALC-SCNC: 16 MMOL/L (ref 7–16)
ANION GAP SERPL CALC-SCNC: 17 MMOL/L (ref 7–16)
BASE EXCESS BLDA CALC-SCNC: -4.2 MMOL/L (ref -2–2)
BASOPHILS # BLD: 0 K/MM3 (ref 0–0.2)
BASOPHILS NFR BLD AUTO: 0.2 % (ref 0–2)
BUN SERPL-MCNC: 72 MG/DL (ref 8–26)
BUN SERPL-MCNC: 84 MG/DL (ref 8–26)
BUN SERPL-MCNC: 87 MG/DL (ref 8–26)
CALCIUM SERPL-MCNC: 7.7 MG/DL (ref 8.4–10.2)
CALCIUM SERPL-MCNC: 7.8 MG/DL (ref 8.4–10.2)
CALCIUM SERPL-MCNC: 8.1 MG/DL (ref 8.4–10.2)
CHLORIDE SERPL-SCNC: 102 MMOL/L (ref 98–107)
CHLORIDE SERPL-SCNC: 103 MMOL/L (ref 98–107)
CHLORIDE SERPL-SCNC: 103 MMOL/L (ref 98–107)
CK SERPL-CCNC: 5044 U/L (ref 30–200)
CO2 BLDA-SCNC: 22.2 MMOL/L
CO2 SERPL-SCNC: 17 MMOL/L (ref 22–29)
CO2 SERPL-SCNC: 18 MMOL/L (ref 22–29)
CO2 SERPL-SCNC: 19 MMOL/L (ref 22–29)
CREAT SERPL-SCNC: 5.93 MG/DL (ref 0.72–1.25)
CREAT SERPL-SCNC: 6.95 MG/DL (ref 0.72–1.25)
CREAT SERPL-SCNC: 7.43 MG/DL (ref 0.72–1.25)
EOSINOPHIL # BLD: 0 K/MM3 (ref 0–0.7)
EOSINOPHIL NFR BLD: 0.2 % (ref 0–4)
ERYTHROCYTE [DISTWIDTH] IN BLOOD BY AUTOMATED COUNT: 14.1 % (ref 11.5–14.5)
GLUCOSE SERPL-MCNC: 137 MG/DL (ref 70–99)
GLUCOSE SERPL-MCNC: 139 MG/DL (ref 70–99)
GLUCOSE SERPL-MCNC: 167 MG/DL (ref 70–99)
GRANULOCYTES # BLD AUTO: 79.1 % (ref 42.2–75.2)
HCO3 BLDA-SCNC: 21 MEQ/L (ref 22–26)
HCT VFR BLD AUTO: 35.3 % (ref 42–52)
HGB BLD-MCNC: 11.3 G/DL (ref 13.5–18)
INHALED O2 CONCENTRATION: 35 %
LDH SERPL-CCNC: 447 U/L (ref 125–220)
LYMPHOCYTES # BLD: 1 K/MM3 (ref 1.2–3.4)
LYMPHOCYTES NFR BLD: 9.5 % (ref 20–51)
MCH RBC QN AUTO: 27 PG (ref 27–31)
MCHC RBC AUTO-ENTMCNC: 32 G/DL (ref 33–37)
MCV RBC AUTO: 85 FL (ref 80–100)
MONOCYTES # BLD: 1.1 K/MM3 (ref 0.1–0.6)
MONOCYTES NFR BLD AUTO: 9.8 % (ref 1.7–9.3)
NEUTROPHILS # BLD: 8.6 K/MM3 (ref 1.4–6.5)
PCO2 BLDA: 39.1 MMHG (ref 35–45)
PLATELET # BLD AUTO: 281 K/MM3 (ref 130–400)
PMV BLD AUTO: 9.5 FL (ref 7.4–10.4)
PO2 BLDA: 55.9 MMHG (ref 80–100)
POTASSIUM SERPL-SCNC: 5.9 MMOL/L (ref 3.5–4.5)
POTASSIUM SERPL-SCNC: 6.9 MMOL/L (ref 3.5–4.5)
POTASSIUM SERPL-SCNC: 7.2 MMOL/L (ref 3.5–4.5)
RBC # BLD AUTO: 4.15 M/MM3 (ref 4.2–5.6)
SAO2 % BLDA: 88.4 % (ref 92–100)
SODIUM SERPL-SCNC: 136 MMOL/L (ref 136–145)
SODIUM SERPL-SCNC: 136 MMOL/L (ref 136–145)
SODIUM SERPL-SCNC: 137 MMOL/L (ref 136–145)

## 2022-01-15 PROCEDURE — 05HN33Z INSERTION OF INFUSION DEVICE INTO LEFT INTERNAL JUGULAR VEIN, PERCUTANEOUS APPROACH: ICD-10-PCS | Performed by: STUDENT IN AN ORGANIZED HEALTH CARE EDUCATION/TRAINING PROGRAM

## 2022-01-15 PROCEDURE — 5A1D70Z PERFORMANCE OF URINARY FILTRATION, INTERMITTENT, LESS THAN 6 HOURS PER DAY: ICD-10-PCS | Performed by: STUDENT IN AN ORGANIZED HEALTH CARE EDUCATION/TRAINING PROGRAM

## 2022-01-15 NOTE — NUR
Patient restless/agitated during night with arousal. Versed and fentanyl
maxed. Per Dr. Esquivel, precedex orders added. Repositioned and turn assist
used during night.

## 2022-01-15 NOTE — NUR
PER ANNITA TARIQ, bladder scan was performed to investigate possible blockage
for PTs low urine output. PT scan showed only 20 cc urine.

## 2022-01-15 NOTE — NUR
Shift assessment complete. All lines,tubes,GTTs,vent,tube feed checked and
verified, VSS. PT is maxed on sedation. Tolerating VENT well.

## 2022-01-15 NOTE — NUR
Sedation vacation not preformed. Patient becomes agitated and pulling against
restraints with arousal/repositioning. VS tachy and hypertensive during those
times.

## 2022-01-16 VITALS — OXYGEN SATURATION: 100 %

## 2022-01-16 VITALS — OXYGEN SATURATION: 90 %

## 2022-01-16 VITALS — OXYGEN SATURATION: 93 %

## 2022-01-16 VITALS — OXYGEN SATURATION: 88 %

## 2022-01-16 VITALS — OXYGEN SATURATION: 80 %

## 2022-01-16 VITALS — OXYGEN SATURATION: 92 %

## 2022-01-16 VITALS — HEART RATE: 79 BPM | TEMPERATURE: 98.2 F | SYSTOLIC BLOOD PRESSURE: 140 MMHG | DIASTOLIC BLOOD PRESSURE: 88 MMHG

## 2022-01-16 VITALS — OXYGEN SATURATION: 99 %

## 2022-01-16 VITALS — OXYGEN SATURATION: 98 %

## 2022-01-16 VITALS — OXYGEN SATURATION: 95 %

## 2022-01-16 VITALS — DIASTOLIC BLOOD PRESSURE: 83 MMHG | TEMPERATURE: 98.2 F | SYSTOLIC BLOOD PRESSURE: 132 MMHG | HEART RATE: 73 BPM

## 2022-01-16 VITALS — OXYGEN SATURATION: 94 %

## 2022-01-16 VITALS — OXYGEN SATURATION: 97 %

## 2022-01-16 VITALS — SYSTOLIC BLOOD PRESSURE: 133 MMHG | HEART RATE: 75 BPM | TEMPERATURE: 98.4 F | DIASTOLIC BLOOD PRESSURE: 83 MMHG

## 2022-01-16 VITALS — TEMPERATURE: 98.9 F | DIASTOLIC BLOOD PRESSURE: 76 MMHG | SYSTOLIC BLOOD PRESSURE: 126 MMHG | HEART RATE: 76 BPM

## 2022-01-16 VITALS — OXYGEN SATURATION: 91 %

## 2022-01-16 VITALS — SYSTOLIC BLOOD PRESSURE: 128 MMHG | DIASTOLIC BLOOD PRESSURE: 81 MMHG | HEART RATE: 74 BPM

## 2022-01-16 VITALS — OXYGEN SATURATION: 96 %

## 2022-01-16 VITALS — OXYGEN SATURATION: 87 %

## 2022-01-16 VITALS — OXYGEN SATURATION: 83 %

## 2022-01-16 VITALS — DIASTOLIC BLOOD PRESSURE: 82 MMHG | SYSTOLIC BLOOD PRESSURE: 132 MMHG

## 2022-01-16 VITALS — OXYGEN SATURATION: 89 %

## 2022-01-16 VITALS — DIASTOLIC BLOOD PRESSURE: 93 MMHG | HEART RATE: 77 BPM | SYSTOLIC BLOOD PRESSURE: 138 MMHG | TEMPERATURE: 98 F

## 2022-01-16 VITALS — SYSTOLIC BLOOD PRESSURE: 139 MMHG | HEART RATE: 78 BPM | TEMPERATURE: 98.5 F | DIASTOLIC BLOOD PRESSURE: 96 MMHG

## 2022-01-16 VITALS — OXYGEN SATURATION: 86 %

## 2022-01-16 VITALS — OXYGEN SATURATION: 84 %

## 2022-01-16 VITALS — OXYGEN SATURATION: 55 %

## 2022-01-16 LAB
ANION GAP SERPL CALC-SCNC: 15 MMOL/L (ref 7–16)
ANION GAP SERPL CALC-SCNC: 16 MMOL/L (ref 7–16)
BASE EXCESS BLDA CALC-SCNC: -5.2 MMOL/L (ref -2–2)
BASOPHILS NFR BLD MANUAL: 1 % (ref 0–2)
BUN SERPL-MCNC: 83 MG/DL (ref 8–26)
BUN SERPL-MCNC: 84 MG/DL (ref 8–26)
CALCIUM SERPL-MCNC: 7.9 MG/DL (ref 8.4–10.2)
CALCIUM SERPL-MCNC: 7.9 MG/DL (ref 8.4–10.2)
CHLORIDE SERPL-SCNC: 102 MMOL/L (ref 98–107)
CHLORIDE SERPL-SCNC: 102 MMOL/L (ref 98–107)
CO2 BLDA-SCNC: 20.9 MMOL/L
CO2 SERPL-SCNC: 21 MMOL/L (ref 22–29)
CO2 SERPL-SCNC: 21 MMOL/L (ref 22–29)
CREAT SERPL-SCNC: 7.01 MG/DL (ref 0.72–1.25)
CREAT SERPL-SCNC: 7.33 MG/DL (ref 0.72–1.25)
EOSINOPHIL NFR BLD: 1 % (ref 0–4)
ERYTHROCYTE [DISTWIDTH] IN BLOOD BY AUTOMATED COUNT: 14.3 % (ref 11.5–14.5)
GLUCOSE SERPL-MCNC: 131 MG/DL (ref 70–99)
GLUCOSE SERPL-MCNC: 143 MG/DL (ref 70–99)
HBV SURFACE AB SER QL IA: <2
HCO3 BLDA-SCNC: 19.8 MEQ/L (ref 22–26)
HCT VFR BLD AUTO: 35.2 % (ref 42–52)
HGB BLD-MCNC: 11 G/DL (ref 13.5–18)
HYPOCHROMIA BLD QL SMEAR: (no result)
INHALED O2 CONCENTRATION: 50 %
LYMPHOCYTES NFR BLD MANUAL: 19 % (ref 20–51)
MAGNESIUM SERPL-MCNC: 2.8 MG/DL (ref 1.6–2.6)
MCH RBC QN AUTO: 27 PG (ref 27–31)
MCHC RBC AUTO-ENTMCNC: 31 G/DL (ref 33–37)
MCV RBC AUTO: 87 FL (ref 80–100)
METAMYELOCYTES NFR BLD MANUAL: 1 % (ref 0–0)
MONOCYTES NFR BLD: 12 % (ref 1.7–9.3)
NEUTS SEG NFR BLD MANUAL: 66 % (ref 42–75.2)
PCO2 BLDA: 36.6 MMHG (ref 35–45)
PLATELET # BLD AUTO: 247 K/MM3 (ref 130–400)
PLATELET BLD QL SMEAR: NORMAL
PMV BLD AUTO: 9.9 FL (ref 7.4–10.4)
PO2 BLDA: 85 MMHG (ref 80–100)
POTASSIUM SERPL-SCNC: 5.6 MMOL/L (ref 3.5–4.5)
POTASSIUM SERPL-SCNC: 5.8 MMOL/L (ref 3.5–4.5)
RBC # BLD AUTO: 4.05 M/MM3 (ref 4.2–5.6)
SAO2 % BLDA: 95.9 % (ref 92–100)
SODIUM SERPL-SCNC: 138 MMOL/L (ref 136–145)
SODIUM SERPL-SCNC: 139 MMOL/L (ref 136–145)

## 2022-01-16 NOTE — NUR
PT HAVING CONTINUOUS LIQUID BOWEL MOVEMENTS. RECTAL TUBE INSERTED PER ORDERS.
PT WIPED DOWN AND LINENS CHANGED. PT TOLERATED WELL. LARGE AMOUNTS OF SPUTUM
COUGHED UP THROUGH ETT AND OUT PT'S NOSE DUE TO COUGHING.

## 2022-01-16 NOTE — NUR
Patient had head CT and dialysis during night. Patient remains intubated and
sedated. Did have 2 bowel movements during night. First bowel movement was
large and loose.
 
Resting in bed this AM.

## 2022-01-16 NOTE — NUR
Assumed care of PT. All lines,tubes,GTTs,vent checked. VSS. pt had an
uneventful night. PT is due for Dialysis this morning.

## 2022-01-16 NOTE — NUR
PATIENT TOLERATED HIS 1 ST HD TX WHICH DC'D 34 MINS EARLY FOR SETUO CLOTTING
DESPITE NSS FLUSHES & HEPARIN 1,000 UNITS THROUGH BLOODLINES. PATIENT'S BLOOD
WAS RETURNED. NEXT PLANNED HD TX TOMORROW ON SUNDAY 1/16/22 @ 1000.

## 2022-01-16 NOTE — NUR
Sedation vacation not preformed. Patient returned from CT. Oxygen saturations
decreased. Patient agitation increased with transfers.

## 2022-01-16 NOTE — NUR
PATIENT TOLERATED HIS 2ND HD TX WITH 1405 ML OF FLUID REMOVED. TX DC'D 7 MINS
EARLY DUE TO SETUP CLOTTING WITHOUT POSITIVE TMP & ABLE TO RETURN ONLY THE
ARTERIAL BLOOD IN BLOODLINES. REMY ARIZA AWARE.

## 2022-01-16 NOTE — NUR
Recieved a call from juan coker of PT. 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 That this brain damage makes
him more susceptible to confusion and agitation.

## 2022-01-16 NOTE — NUR
BEDSIDE REPORT RECEIVED FROM NATAN HUGHES. BILATERAL WRIST RESTRAINTS IN
PLACE. WHITMORE CATHETER TO DEPENDENT DRAINAGE. LEFT IJ DIALYSIS CATHETER IN
PLACE; CLEAN, DRY, AND INTACT. RIGHT IJ TRIPLE LUMEN CATHETER IN PLACE; CLEAN,
DRY, AND INTACT. SEE GTT FLOW SHEETS FOR RATES AND INFUSIONS. 20G PERIPHERAL
IV IN RIGHT HAND. NASOGASTRIC TUBE 70 CM AT NARE; INFUSING NEPRO AT 40ML/HR
WITH 30ML FLUSH Q4H. 8.0 ETT 25 CM AT TEETH WITH CURRENT VENT SETTINGS; AC
MODE, , PEEP 8, FIO2 40%, RATE 24. PT TOLERATING VENTILATION WELL. VITAL
SIGNS STABLE.

## 2022-01-17 VITALS — OXYGEN SATURATION: 97 %

## 2022-01-17 VITALS — OXYGEN SATURATION: 88 %

## 2022-01-17 VITALS — OXYGEN SATURATION: 90 %

## 2022-01-17 VITALS
OXYGEN SATURATION: 94 % | TEMPERATURE: 98.5 F | HEART RATE: 86 BPM | DIASTOLIC BLOOD PRESSURE: 69 MMHG | SYSTOLIC BLOOD PRESSURE: 123 MMHG

## 2022-01-17 VITALS — OXYGEN SATURATION: 99 %

## 2022-01-17 VITALS — OXYGEN SATURATION: 100 %

## 2022-01-17 VITALS — OXYGEN SATURATION: 91 %

## 2022-01-17 VITALS — OXYGEN SATURATION: 99 % | HEART RATE: 77 BPM | SYSTOLIC BLOOD PRESSURE: 120 MMHG | DIASTOLIC BLOOD PRESSURE: 72 MMHG

## 2022-01-17 VITALS — SYSTOLIC BLOOD PRESSURE: 153 MMHG | HEART RATE: 104 BPM | DIASTOLIC BLOOD PRESSURE: 74 MMHG

## 2022-01-17 VITALS — OXYGEN SATURATION: 96 %

## 2022-01-17 VITALS — OXYGEN SATURATION: 98 %

## 2022-01-17 VITALS — TEMPERATURE: 98.7 F | HEART RATE: 77 BPM | SYSTOLIC BLOOD PRESSURE: 128 MMHG | DIASTOLIC BLOOD PRESSURE: 78 MMHG

## 2022-01-17 VITALS — OXYGEN SATURATION: 82 %

## 2022-01-17 VITALS — OXYGEN SATURATION: 95 %

## 2022-01-17 VITALS — OXYGEN SATURATION: 94 %

## 2022-01-17 VITALS — DIASTOLIC BLOOD PRESSURE: 72 MMHG | SYSTOLIC BLOOD PRESSURE: 120 MMHG | HEART RATE: 77 BPM

## 2022-01-17 VITALS
HEART RATE: 80 BPM | SYSTOLIC BLOOD PRESSURE: 118 MMHG | TEMPERATURE: 98.4 F | DIASTOLIC BLOOD PRESSURE: 70 MMHG | OXYGEN SATURATION: 98 %

## 2022-01-17 VITALS — OXYGEN SATURATION: 92 %

## 2022-01-17 VITALS — SYSTOLIC BLOOD PRESSURE: 150 MMHG | DIASTOLIC BLOOD PRESSURE: 93 MMHG | HEART RATE: 92 BPM

## 2022-01-17 VITALS — OXYGEN SATURATION: 93 %

## 2022-01-17 VITALS — OXYGEN SATURATION: 94 % | HEART RATE: 81 BPM | SYSTOLIC BLOOD PRESSURE: 117 MMHG | DIASTOLIC BLOOD PRESSURE: 73 MMHG

## 2022-01-17 VITALS
HEART RATE: 86 BPM | OXYGEN SATURATION: 96 % | SYSTOLIC BLOOD PRESSURE: 135 MMHG | TEMPERATURE: 100 F | DIASTOLIC BLOOD PRESSURE: 80 MMHG

## 2022-01-17 VITALS
SYSTOLIC BLOOD PRESSURE: 143 MMHG | DIASTOLIC BLOOD PRESSURE: 86 MMHG | HEART RATE: 86 BPM | TEMPERATURE: 99.6 F | OXYGEN SATURATION: 98 %

## 2022-01-17 VITALS — OXYGEN SATURATION: 79 %

## 2022-01-17 VITALS — HEART RATE: 103 BPM | DIASTOLIC BLOOD PRESSURE: 89 MMHG | SYSTOLIC BLOOD PRESSURE: 161 MMHG | OXYGEN SATURATION: 93 %

## 2022-01-17 VITALS — OXYGEN SATURATION: 85 %

## 2022-01-17 VITALS — DIASTOLIC BLOOD PRESSURE: 107 MMHG | HEART RATE: 114 BPM | SYSTOLIC BLOOD PRESSURE: 169 MMHG

## 2022-01-17 VITALS — OXYGEN SATURATION: 89 %

## 2022-01-17 VITALS — OXYGEN SATURATION: 86 %

## 2022-01-17 VITALS — HEART RATE: 74 BPM | SYSTOLIC BLOOD PRESSURE: 112 MMHG | DIASTOLIC BLOOD PRESSURE: 69 MMHG

## 2022-01-17 VITALS — HEART RATE: 94 BPM | TEMPERATURE: 100.7 F | DIASTOLIC BLOOD PRESSURE: 74 MMHG | SYSTOLIC BLOOD PRESSURE: 134 MMHG

## 2022-01-17 VITALS — DIASTOLIC BLOOD PRESSURE: 104 MMHG | HEART RATE: 108 BPM | SYSTOLIC BLOOD PRESSURE: 176 MMHG

## 2022-01-17 VITALS — OXYGEN SATURATION: 80 %

## 2022-01-17 VITALS — SYSTOLIC BLOOD PRESSURE: 112 MMHG | HEART RATE: 74 BPM | DIASTOLIC BLOOD PRESSURE: 69 MMHG

## 2022-01-17 VITALS — OXYGEN SATURATION: 87 %

## 2022-01-17 LAB
ANION GAP SERPL CALC-SCNC: 20 MMOL/L (ref 7–16)
BASE EXCESS BLDA CALC-SCNC: -1 MMOL/L (ref -2–2)
BASOPHILS # BLD: 0 K/MM3 (ref 0–0.2)
BASOPHILS NFR BLD AUTO: 0.2 % (ref 0–2)
BUN SERPL-MCNC: 82 MG/DL (ref 8–26)
CALCIUM SERPL-MCNC: 8 MG/DL (ref 8.4–10.2)
CHLORIDE SERPL-SCNC: 99 MMOL/L (ref 98–107)
CO2 BLDA-SCNC: 24.1 MMOL/L
CO2 SERPL-SCNC: 21 MMOL/L (ref 22–29)
CREAT SERPL-SCNC: 7.45 MG/DL (ref 0.72–1.25)
EOSINOPHIL # BLD: 0 K/MM3 (ref 0–0.7)
EOSINOPHIL NFR BLD: 0.2 % (ref 0–4)
ERYTHROCYTE [DISTWIDTH] IN BLOOD BY AUTOMATED COUNT: 14.2 % (ref 11.5–14.5)
GLUCOSE SERPL-MCNC: 126 MG/DL (ref 70–99)
GLUCOSE UR QL STRIP.AUTO: (no result)
GRANULOCYTES # BLD AUTO: 75.2 % (ref 42.2–75.2)
HCO3 BLDA-SCNC: 23 MEQ/L (ref 22–26)
HCT VFR BLD AUTO: 32.8 % (ref 42–52)
HGB BLD-MCNC: 10.4 G/DL (ref 13.5–18)
INHALED O2 CONCENTRATION: 40 %
LYMPHOCYTES # BLD: 1.5 K/MM3 (ref 1.2–3.4)
LYMPHOCYTES NFR BLD: 12.3 % (ref 20–51)
MAGNESIUM SERPL-MCNC: 2.8 MG/DL (ref 1.6–2.6)
MCH RBC QN AUTO: 28 PG (ref 27–31)
MCHC RBC AUTO-ENTMCNC: 32 G/DL (ref 33–37)
MCV RBC AUTO: 88 FL (ref 80–100)
MONOCYTES # BLD: 1.4 K/MM3 (ref 0.1–0.6)
MONOCYTES NFR BLD AUTO: 11 % (ref 1.7–9.3)
NEUTROPHILS # BLD: 9.2 K/MM3 (ref 1.4–6.5)
PCO2 BLDA: 35.8 MMHG (ref 35–45)
PH UR STRIP.AUTO: 6 [PH] (ref 5–8)
PLATELET # BLD AUTO: 220 K/MM3 (ref 130–400)
PMV BLD AUTO: 9.8 FL (ref 7.4–10.4)
PO2 BLDA: 67.1 MMHG (ref 80–100)
POTASSIUM SERPL-SCNC: 4.8 MMOL/L (ref 3.5–4.5)
RBC # BLD AUTO: 3.72 M/MM3 (ref 4.2–5.6)
RBC # UR STRIP.AUTO: (no result) /UL
RBC # UR: >50 /HPF (ref 0–2)
SAO2 % BLDA: 92.9 % (ref 92–100)
SODIUM SERPL-SCNC: 140 MMOL/L (ref 136–145)
SP GR UR STRIP.AUTO: 1.01 (ref 1–1.03)
SQUAMOUS # URNS: (no result) /HPF (ref 0–10)
UA DIPSTICK PNL UR STRIP.AUTO: (no result)
URINE LEUKOCYTE ESTERASE: (no result)
URN COLLECT METHOD CLASS: (no result)
WBC # UR: >50 /HPF (ref 0–2)

## 2022-01-17 NOTE — NUR
Patient coughing against ventilator; moving all extremities but not following
commands at this time.  Will continue to monitor.

## 2022-01-17 NOTE — NUR
PATIENT HAVING COUGHIN FITS/ INCREASES PULSES/ B/P ELEVATED/ NUMEROUS
SUCTIONING/ INCREASED MEDICATION/ NOW 90 MINUTES LATER PATIENT RESTING

## 2022-01-17 NOTE — NUR
PATIENT TOLERATED HIS 3RD HD TX WITH 1,000 ML OF FLUID REMOVED. NEXT HD TX
PENDING LABS & ASSESSMENT.

## 2022-01-17 NOTE — NUR
DECREASE IN SEDATION NOT APPROPRIATE FOR PT AT THIS TIME. PT HAS WOKEN UP
MULTIPLE TIMES THROUGHOUT SHIFT; MOVED LEGS OFF BED, AND PULLS AGAINST
RESTRAINTS.

## 2022-01-18 VITALS — OXYGEN SATURATION: 94 %

## 2022-01-18 VITALS — OXYGEN SATURATION: 96 % | DIASTOLIC BLOOD PRESSURE: 72 MMHG | HEART RATE: 77 BPM | SYSTOLIC BLOOD PRESSURE: 127 MMHG

## 2022-01-18 VITALS — OXYGEN SATURATION: 98 %

## 2022-01-18 VITALS — OXYGEN SATURATION: 95 %

## 2022-01-18 VITALS — OXYGEN SATURATION: 97 %

## 2022-01-18 VITALS — OXYGEN SATURATION: 92 %

## 2022-01-18 VITALS — OXYGEN SATURATION: 96 %

## 2022-01-18 VITALS
HEART RATE: 88 BPM | SYSTOLIC BLOOD PRESSURE: 136 MMHG | TEMPERATURE: 99.3 F | DIASTOLIC BLOOD PRESSURE: 83 MMHG | OXYGEN SATURATION: 95 %

## 2022-01-18 VITALS — OXYGEN SATURATION: 93 %

## 2022-01-18 VITALS
TEMPERATURE: 98 F | HEART RATE: 102 BPM | SYSTOLIC BLOOD PRESSURE: 170 MMHG | OXYGEN SATURATION: 96 % | DIASTOLIC BLOOD PRESSURE: 87 MMHG

## 2022-01-18 VITALS — HEART RATE: 77 BPM | DIASTOLIC BLOOD PRESSURE: 80 MMHG | SYSTOLIC BLOOD PRESSURE: 124 MMHG

## 2022-01-18 VITALS — OXYGEN SATURATION: 91 %

## 2022-01-18 VITALS — OXYGEN SATURATION: 90 %

## 2022-01-18 VITALS — HEART RATE: 85 BPM | SYSTOLIC BLOOD PRESSURE: 135 MMHG | OXYGEN SATURATION: 96 % | DIASTOLIC BLOOD PRESSURE: 76 MMHG

## 2022-01-18 VITALS — DIASTOLIC BLOOD PRESSURE: 77 MMHG | HEART RATE: 85 BPM | SYSTOLIC BLOOD PRESSURE: 120 MMHG

## 2022-01-18 VITALS — SYSTOLIC BLOOD PRESSURE: 118 MMHG | HEART RATE: 81 BPM | TEMPERATURE: 97.9 F | DIASTOLIC BLOOD PRESSURE: 75 MMHG

## 2022-01-18 VITALS — OXYGEN SATURATION: 99 %

## 2022-01-18 VITALS — OXYGEN SATURATION: 100 %

## 2022-01-18 VITALS — SYSTOLIC BLOOD PRESSURE: 127 MMHG | DIASTOLIC BLOOD PRESSURE: 72 MMHG | HEART RATE: 77 BPM

## 2022-01-18 VITALS — HEART RATE: 85 BPM | DIASTOLIC BLOOD PRESSURE: 77 MMHG | SYSTOLIC BLOOD PRESSURE: 120 MMHG | OXYGEN SATURATION: 94 %

## 2022-01-18 VITALS — OXYGEN SATURATION: 89 %

## 2022-01-18 VITALS — OXYGEN SATURATION: 93 % | DIASTOLIC BLOOD PRESSURE: 93 MMHG | HEART RATE: 107 BPM | SYSTOLIC BLOOD PRESSURE: 175 MMHG

## 2022-01-18 VITALS — HEART RATE: 85 BPM | DIASTOLIC BLOOD PRESSURE: 76 MMHG | SYSTOLIC BLOOD PRESSURE: 135 MMHG

## 2022-01-18 VITALS
TEMPERATURE: 100.7 F | DIASTOLIC BLOOD PRESSURE: 98 MMHG | OXYGEN SATURATION: 93 % | HEART RATE: 110 BPM | SYSTOLIC BLOOD PRESSURE: 182 MMHG

## 2022-01-18 VITALS — TEMPERATURE: 99.2 F | SYSTOLIC BLOOD PRESSURE: 154 MMHG | HEART RATE: 97 BPM | DIASTOLIC BLOOD PRESSURE: 95 MMHG

## 2022-01-18 VITALS — SYSTOLIC BLOOD PRESSURE: 164 MMHG | DIASTOLIC BLOOD PRESSURE: 93 MMHG | TEMPERATURE: 100.3 F

## 2022-01-18 LAB
ANION GAP SERPL CALC-SCNC: 18 MMOL/L (ref 7–16)
BASE EXCESS BLDA CALC-SCNC: 3.5 MMOL/L (ref -2–2)
BASOPHILS # BLD: 0 K/MM3 (ref 0–0.2)
BASOPHILS NFR BLD AUTO: 0.2 % (ref 0–2)
BUN SERPL-MCNC: 73 MG/DL (ref 8–26)
CALCIUM SERPL-MCNC: 8.2 MG/DL (ref 8.4–10.2)
CHLORIDE SERPL-SCNC: 97 MMOL/L (ref 98–107)
CO2 BLDA-SCNC: 28.1 MMOL/L
CO2 SERPL-SCNC: 25 MMOL/L (ref 22–29)
CREAT SERPL-SCNC: 6.64 MG/DL (ref 0.72–1.25)
EOSINOPHIL # BLD: 0 K/MM3 (ref 0–0.7)
EOSINOPHIL NFR BLD: 0.2 % (ref 0–4)
ERYTHROCYTE [DISTWIDTH] IN BLOOD BY AUTOMATED COUNT: 14.3 % (ref 11.5–14.5)
GLUCOSE SERPL-MCNC: 142 MG/DL (ref 70–99)
GRANULOCYTES # BLD AUTO: 72.1 % (ref 42.2–75.2)
HCO3 BLDA-SCNC: 27 MEQ/L (ref 22–26)
HCT VFR BLD AUTO: 36.4 % (ref 42–52)
HGB BLD-MCNC: 11.7 G/DL (ref 13.5–18)
INHALED O2 CONCENTRATION: 40 %
LYMPHOCYTES # BLD: 1.4 K/MM3 (ref 1.2–3.4)
LYMPHOCYTES NFR BLD: 14.2 % (ref 20–51)
MAGNESIUM SERPL-MCNC: 2.8 MG/DL (ref 1.6–2.6)
MCH RBC QN AUTO: 28 PG (ref 27–31)
MCHC RBC AUTO-ENTMCNC: 32 G/DL (ref 33–37)
MCV RBC AUTO: 86 FL (ref 80–100)
MONOCYTES # BLD: 1.2 K/MM3 (ref 0.1–0.6)
MONOCYTES NFR BLD AUTO: 12.3 % (ref 1.7–9.3)
NEUTROPHILS # BLD: 7.1 K/MM3 (ref 1.4–6.5)
PCO2 BLDA: 36.8 MMHG (ref 35–45)
PLATELET # BLD AUTO: 203 K/MM3 (ref 130–400)
PMV BLD AUTO: 9.8 FL (ref 7.4–10.4)
PO2 BLDA: 68.4 MMHG (ref 80–100)
POTASSIUM SERPL-SCNC: 4.3 MMOL/L (ref 3.5–4.5)
RBC # BLD AUTO: 4.25 M/MM3 (ref 4.2–5.6)
SAO2 % BLDA: 93.1 % (ref 92–100)
SODIUM SERPL-SCNC: 140 MMOL/L (ref 136–145)

## 2022-01-18 NOTE — NUR
Assessment complete and charted. Patient intubated and sedated. Patient waking
and pulling against restraints when staff in room.  Does not follow commands.
Relaxed when unaroused. Repositioned and placed turn assist on at this time.

## 2022-01-18 NOTE — NUR
After weaning down on sedation this shift, patient opens eyes on command. Able
to squeeze this nurses hands and nod head "yes" and "no" appropriately to
simple questions.  Observed moving all four extremities. Will continue to
monitor.

## 2022-01-19 VITALS — OXYGEN SATURATION: 97 %

## 2022-01-19 VITALS — OXYGEN SATURATION: 98 %

## 2022-01-19 VITALS — OXYGEN SATURATION: 94 %

## 2022-01-19 VITALS — OXYGEN SATURATION: 92 %

## 2022-01-19 VITALS — OXYGEN SATURATION: 95 %

## 2022-01-19 VITALS — OXYGEN SATURATION: 93 %

## 2022-01-19 VITALS — OXYGEN SATURATION: 96 %

## 2022-01-19 VITALS — TEMPERATURE: 100.3 F | DIASTOLIC BLOOD PRESSURE: 75 MMHG | SYSTOLIC BLOOD PRESSURE: 132 MMHG | HEART RATE: 94 BPM

## 2022-01-19 VITALS — OXYGEN SATURATION: 99 %

## 2022-01-19 VITALS — OXYGEN SATURATION: 86 %

## 2022-01-19 VITALS
TEMPERATURE: 99.3 F | HEART RATE: 101 BPM | DIASTOLIC BLOOD PRESSURE: 93 MMHG | SYSTOLIC BLOOD PRESSURE: 165 MMHG | OXYGEN SATURATION: 96 %

## 2022-01-19 VITALS — OXYGEN SATURATION: 91 %

## 2022-01-19 VITALS — OXYGEN SATURATION: 100 %

## 2022-01-19 VITALS — SYSTOLIC BLOOD PRESSURE: 154 MMHG | DIASTOLIC BLOOD PRESSURE: 90 MMHG | HEART RATE: 111 BPM | TEMPERATURE: 100.5 F

## 2022-01-19 VITALS — OXYGEN SATURATION: 89 %

## 2022-01-19 VITALS — SYSTOLIC BLOOD PRESSURE: 156 MMHG | DIASTOLIC BLOOD PRESSURE: 100 MMHG | HEART RATE: 123 BPM

## 2022-01-19 VITALS — TEMPERATURE: 99.5 F | HEART RATE: 97 BPM | DIASTOLIC BLOOD PRESSURE: 95 MMHG | SYSTOLIC BLOOD PRESSURE: 153 MMHG

## 2022-01-19 VITALS — OXYGEN SATURATION: 90 %

## 2022-01-19 VITALS — OXYGEN SATURATION: 83 %

## 2022-01-19 VITALS — OXYGEN SATURATION: 87 %

## 2022-01-19 VITALS — OXYGEN SATURATION: 84 %

## 2022-01-19 VITALS — TEMPERATURE: 99.1 F | HEART RATE: 97 BPM | SYSTOLIC BLOOD PRESSURE: 131 MMHG | DIASTOLIC BLOOD PRESSURE: 79 MMHG

## 2022-01-19 VITALS — OXYGEN SATURATION: 88 %

## 2022-01-19 VITALS — DIASTOLIC BLOOD PRESSURE: 84 MMHG | HEART RATE: 97 BPM | TEMPERATURE: 99.1 F | SYSTOLIC BLOOD PRESSURE: 150 MMHG

## 2022-01-19 VITALS — OXYGEN SATURATION: 82 %

## 2022-01-19 VITALS — HEART RATE: 112 BPM

## 2022-01-19 VITALS — DIASTOLIC BLOOD PRESSURE: 89 MMHG | TEMPERATURE: 99.1 F | HEART RATE: 98 BPM | SYSTOLIC BLOOD PRESSURE: 146 MMHG

## 2022-01-19 VITALS — OXYGEN SATURATION: 71 %

## 2022-01-19 LAB
ANION GAP SERPL CALC-SCNC: 20 MMOL/L (ref 7–16)
BASE EXCESS BLDA CALC-SCNC: 0.1 MMOL/L (ref -2–2)
BASOPHILS # BLD: 0 K/MM3 (ref 0–0.2)
BASOPHILS NFR BLD AUTO: 0.3 % (ref 0–2)
BUN SERPL-MCNC: 95 MG/DL (ref 8–26)
CALCIUM SERPL-MCNC: 8.4 MG/DL (ref 8.4–10.2)
CHLORIDE SERPL-SCNC: 96 MMOL/L (ref 98–107)
CO2 BLDA-SCNC: 25.3 MMOL/L
CO2 SERPL-SCNC: 24 MMOL/L (ref 22–29)
CREAT SERPL-SCNC: 8.42 MG/DL (ref 0.72–1.25)
EOSINOPHIL # BLD: 0.1 K/MM3 (ref 0–0.7)
EOSINOPHIL NFR BLD: 1 % (ref 0–4)
ERYTHROCYTE [DISTWIDTH] IN BLOOD BY AUTOMATED COUNT: 14.4 % (ref 11.5–14.5)
GLUCOSE SERPL-MCNC: 141 MG/DL (ref 70–99)
GRANULOCYTES # BLD AUTO: 75.5 % (ref 42.2–75.2)
HCO3 BLDA-SCNC: 24.2 MEQ/L (ref 22–26)
HCT VFR BLD AUTO: 29 % (ref 42–52)
HGB BLD-MCNC: 9.1 G/DL (ref 13.5–18)
INHALED O2 CONCENTRATION: 35 %
LYMPHOCYTES # BLD: 1.3 K/MM3 (ref 1.2–3.4)
LYMPHOCYTES NFR BLD: 11.1 % (ref 20–51)
MAGNESIUM SERPL-MCNC: 3.1 MG/DL (ref 1.6–2.6)
MCH RBC QN AUTO: 28 PG (ref 27–31)
MCHC RBC AUTO-ENTMCNC: 31 G/DL (ref 33–37)
MCV RBC AUTO: 89 FL (ref 80–100)
MONOCYTES # BLD: 1.3 K/MM3 (ref 0.1–0.6)
MONOCYTES NFR BLD AUTO: 10.8 % (ref 1.7–9.3)
NEUTROPHILS # BLD: 9.1 K/MM3 (ref 1.4–6.5)
PCO2 BLDA: 37.5 MMHG (ref 35–45)
PH UR STRIP.AUTO: 8 [PH] (ref 5–8)
PLATELET # BLD AUTO: 197 K/MM3 (ref 130–400)
PMV BLD AUTO: 9.9 FL (ref 7.4–10.4)
PO2 BLDA: 70.6 MMHG (ref 80–100)
POTASSIUM SERPL-SCNC: 4.7 MMOL/L (ref 3.5–4.5)
RBC # BLD AUTO: 3.27 M/MM3 (ref 4.2–5.6)
RBC # UR STRIP.AUTO: (no result) /UL
RBC # UR: >50 /HPF (ref 0–2)
SAO2 % BLDA: 93.6 % (ref 92–100)
SODIUM SERPL-SCNC: 140 MMOL/L (ref 136–145)
SP GR UR STRIP.AUTO: 1 (ref 1–1.03)
UA DIPSTICK PNL UR STRIP.AUTO: (no result)
URINE LEUKOCYTE ESTERASE: (no result)
URN COLLECT METHOD CLASS: (no result)

## 2022-01-19 NOTE — NUR
NOT NECESSARY FOR PT AT THIS TIME. PT HAS WOKEN UP PERIODICALLY THROUGHOUT
THE DAY; THRASHES ARMS AND LEGS ON BED. DIFFICULT TO RE-SEDATE.

## 2022-01-19 NOTE — NUR
PATIENT NOTED TO BE AGGITATED, THROWING FEET OUT OF BED MEDICATIONS INCREASED
TO IMPROVE PATIENT COMFORT

## 2022-01-19 NOTE — NUR
Patient had episodes during night of fighting ventilator, pulling against
restraints, thashing head, and kicking. Patient sedation increased at times of
episode.  Patient resting comfortably this AM. Sedation gradually decreased
for weaning trial at 0700. Currently resting with eyes closed and slight
movements made.  Did not follow commands or answer yes/no questions throughout
night.

## 2022-01-19 NOTE — NUR
PATIENT TOLERATED HIS 4TH HD TX WITH 249 ML OF FLUID REMOVED TODAY. LOW BFR 1
HOUR INTO TX, PAUSED TX TO DWELL ACTIVASE IN CATHETER PORTS FOR 1 HOUR THEN
RESUMED HD TX WITH  @ INTIATION & AFTER 15 MINS DECREASED BFR ONCE
AGAIN WITH TROUBLESHOOTING UNSUCCESSFUL. SETUP CLOTTED WITH 1HR 20 MINS
REMAINING, ABLE TO RETURN ARTERIAL BLOOD ONLY & RESTARTED TX WITH A NEW SETUP.
 . TX DC'D 20 MINS EARLY FOR UNABLE TO MAINTAIN FLOW WITH BFR
LESS THAN 200 & PATIENT'S BLOOD RETURNED WITHOUT ANY ISSUES. NEXT HD TX
PENDING LABS & ASSESSMENT.

## 2022-01-20 VITALS — OXYGEN SATURATION: 97 %

## 2022-01-20 VITALS — OXYGEN SATURATION: 91 %

## 2022-01-20 VITALS — OXYGEN SATURATION: 96 %

## 2022-01-20 VITALS — OXYGEN SATURATION: 92 %

## 2022-01-20 VITALS — OXYGEN SATURATION: 95 %

## 2022-01-20 VITALS — DIASTOLIC BLOOD PRESSURE: 65 MMHG | TEMPERATURE: 97.8 F | HEART RATE: 79 BPM | SYSTOLIC BLOOD PRESSURE: 108 MMHG

## 2022-01-20 VITALS — OXYGEN SATURATION: 90 %

## 2022-01-20 VITALS — OXYGEN SATURATION: 93 %

## 2022-01-20 VITALS — OXYGEN SATURATION: 94 %

## 2022-01-20 VITALS — SYSTOLIC BLOOD PRESSURE: 126 MMHG | DIASTOLIC BLOOD PRESSURE: 71 MMHG | HEART RATE: 79 BPM | TEMPERATURE: 98.6 F

## 2022-01-20 VITALS — OXYGEN SATURATION: 98 %

## 2022-01-20 VITALS — OXYGEN SATURATION: 99 %

## 2022-01-20 VITALS — OXYGEN SATURATION: 85 %

## 2022-01-20 VITALS — OXYGEN SATURATION: 87 %

## 2022-01-20 VITALS — OXYGEN SATURATION: 84 %

## 2022-01-20 VITALS — OXYGEN SATURATION: 86 %

## 2022-01-20 VITALS — OXYGEN SATURATION: 88 %

## 2022-01-20 VITALS — OXYGEN SATURATION: 100 %

## 2022-01-20 VITALS — OXYGEN SATURATION: 89 %

## 2022-01-20 VITALS — SYSTOLIC BLOOD PRESSURE: 138 MMHG | HEART RATE: 90 BPM | TEMPERATURE: 98.6 F | DIASTOLIC BLOOD PRESSURE: 85 MMHG

## 2022-01-20 VITALS — DIASTOLIC BLOOD PRESSURE: 96 MMHG | TEMPERATURE: 98.8 F | SYSTOLIC BLOOD PRESSURE: 165 MMHG | HEART RATE: 96 BPM

## 2022-01-20 VITALS
DIASTOLIC BLOOD PRESSURE: 86 MMHG | HEART RATE: 93 BPM | OXYGEN SATURATION: 93 % | SYSTOLIC BLOOD PRESSURE: 161 MMHG | TEMPERATURE: 98.3 F

## 2022-01-20 VITALS — OXYGEN SATURATION: 65 %

## 2022-01-20 VITALS — HEART RATE: 96 BPM | DIASTOLIC BLOOD PRESSURE: 86 MMHG | TEMPERATURE: 99.3 F | SYSTOLIC BLOOD PRESSURE: 146 MMHG

## 2022-01-20 VITALS — OXYGEN SATURATION: 81 %

## 2022-01-20 VITALS — OXYGEN SATURATION: 80 %

## 2022-01-20 VITALS — OXYGEN SATURATION: 77 %

## 2022-01-20 VITALS — OXYGEN SATURATION: 76 %

## 2022-01-20 LAB
ANION GAP SERPL CALC-SCNC: 18 MMOL/L (ref 7–16)
BASE EXCESS BLDA CALC-SCNC: 0.2 MMOL/L (ref -2–2)
BASOPHILS # BLD: 0 K/MM3 (ref 0–0.2)
BASOPHILS NFR BLD AUTO: 0.2 % (ref 0–2)
BUN SERPL-MCNC: 78 MG/DL (ref 8–26)
CALCIUM SERPL-MCNC: 8.6 MG/DL (ref 8.4–10.2)
CHLORIDE SERPL-SCNC: 101 MMOL/L (ref 98–107)
CO2 BLDA-SCNC: 26.1 MMOL/L
CO2 SERPL-SCNC: 23 MMOL/L (ref 22–29)
CREAT SERPL-SCNC: 7.48 MG/DL (ref 0.72–1.25)
EOSINOPHIL # BLD: 0.2 K/MM3 (ref 0–0.7)
EOSINOPHIL NFR BLD: 1.6 % (ref 0–4)
ERYTHROCYTE [DISTWIDTH] IN BLOOD BY AUTOMATED COUNT: 14.4 % (ref 11.5–14.5)
GLUCOSE SERPL-MCNC: 95 MG/DL (ref 70–99)
GRANULOCYTES # BLD AUTO: 72.7 % (ref 42.2–75.2)
HCO3 BLDA-SCNC: 24.8 MEQ/L (ref 22–26)
HCT VFR BLD AUTO: 30.2 % (ref 42–52)
HGB BLD-MCNC: 9 G/DL (ref 13.5–18)
INHALED O2 CONCENTRATION: 35 %
LYMPHOCYTES # BLD: 1.5 K/MM3 (ref 1.2–3.4)
LYMPHOCYTES NFR BLD: 13.1 % (ref 20–51)
MAGNESIUM SERPL-MCNC: 3.1 MG/DL (ref 1.6–2.6)
MCH RBC QN AUTO: 27 PG (ref 27–31)
MCHC RBC AUTO-ENTMCNC: 30 G/DL (ref 33–37)
MCV RBC AUTO: 90 FL (ref 80–100)
MONOCYTES # BLD: 1.3 K/MM3 (ref 0.1–0.6)
MONOCYTES NFR BLD AUTO: 11.4 % (ref 1.7–9.3)
NEUTROPHILS # BLD: 8.1 K/MM3 (ref 1.4–6.5)
PCO2 BLDA: 40 MMHG (ref 35–45)
PLATELET # BLD AUTO: 202 K/MM3 (ref 130–400)
PMV BLD AUTO: 10.2 FL (ref 7.4–10.4)
PO2 BLDA: 67.1 MMHG (ref 80–100)
POTASSIUM SERPL-SCNC: 4.2 MMOL/L (ref 3.5–4.5)
RBC # BLD AUTO: 3.34 M/MM3 (ref 4.2–5.6)
SAO2 % BLDA: 93.2 % (ref 92–100)
SODIUM SERPL-SCNC: 142 MMOL/L (ref 136–145)

## 2022-01-20 NOTE — NUR
UPON ASSESSING PT IS WAS FOUND THAT PT HAS PULLED OUT NG TUBE. HOSPITALIST
NOTIFIED. NOT NECESSARY TO REINSERT NG TUBE DUE TO PT GOING TO SURGERY THIS
AFTERNOON FOR PEG TUBE PLACEMENT.

## 2022-01-20 NOTE — NUR
contacted Earnest with [facility] specialty [facility] and gave a
referral with faxed clinical information.  Worker contacted patient's niece,
Chantel, and sister, Christina, and advised of the above information.  Worker
provided information on [facility] and the transfer
information.  Chantel and Christina verbalized understanding and agreement with
with above information.

## 2022-01-20 NOTE — NUR
RIGHT IJ TRIPLE LUMEN AND LEFT IJ DIALYSIS CATH DRESSINGS BOTH CHANGED USING
STERILE TECHNIQUE DUE TO BEING SATURATED WITH BLOOD FOLLOWING TRACHEOSTOMY
PLACEMENT.

## 2022-01-20 NOTE — NUR
PT BACK FROM SURGERY AFTER HAVING TRACH PLACED. 6.0 SHILEY PLACED. OBTURATOR
IN ROOM AND PLACED ON BOARD. BILATERAL WRIST RESTRAINTS REMAIN IN PLACE. FC
CONTINUES TO DRAIN TO DEPENDENT DRAINAGE. LEFT IJ DIALYSIS CATH AND RIGHT IJ
TRIPLE LUMEN REMAIN IN PLACE. VITAL SIGNS STABLE. RT NOTIFED THAT PATIENT IS
BACK FROM SURGERY AND IN ROOM.

## 2022-01-20 NOTE — NUR
REPORT RECEIVED FROM NATAN FELIX. FC TO DEPENDENT DRAINAGE. RIGHT IJ TRIPLE LUMEN
CATHETER IN PLACE; CLEAN, DRY, AND INTACT. SEE GTT FLOW SHEET FOR INFUSIONS
AND RATES. LEFT IJ DIALYSIS CATHETER IN PLACE; CLEAN, DRY, AND INTACT. 8.0 ETT
25CM AT THE TEETH. PT ACCEPTING VENTILATION WITH CURRENT VENT SETTINGS; AC
MODE, , PEEP 5, FIO2 35%, RATE 24. OG TUBE 67 CM AT TEETH; TUBE FEEDS ON
HOLD FOR SURGERY TODAY. PLAN FOR TRACHEOSTOMY, PEG TUBE, AND TUNNELLED
DIALYSIS CATHETER PLACEMENT TODAY.

## 2022-01-20 NOTE — NUR
2033 CALLED E CARE DR. GIBBS REGARDING PATIENT LOW BP, INFORMED OF BASIC
HISTORY AND CURRENT STATUS, AS WELL AS CURRENT BP READING OF 71/51 NEW ORDERS
GIVEN FOR NS BOLUS, AND THIS NURSE CLARIFIED WITH DR. GIBBS TO NOT PUT
ESMOLOL ON HOLD DUE TO A FLUTTER WITH RVR
2055 NOTED THAT BOLUS ORDERS WERE PLACED FOR 1000ML/HR FOR MULTIPLE HOURS
CALLED DR. GIBBS TO CLARIFY WAS INFORMED TO GIVE BOLUS AND REPEAT ONLY IF
INEFFECTIVE AND THEN HUNG UP.
2145 BOLUS COMPLETED AND NOTED THAT PATIENTS /47 PLACED REMAINING NS
ORDER ON HOLD AT THIS TIME
2200 OBTAINED BLOOD FOR ORDERED HEPARIN XA
2212 INFORMED [place] HOSPITALIST KERRI MULLEN OF EVENTS AND ORDERS THAT
WERE GIVEN FOR PATIENT AND ACTIONS TAKEN STATED THAT AS LONG AS MAP WAS
GREATER THAN 65

## 2022-01-20 NOTE — NUR
SEDATION VACATION AT THIS TIME, PATIENT VERSED AND FENTANYL DECREASED BY 1/2
PER ORDERS, PATIENT RESTING IN BED AT THIS TIME.

## 2022-01-21 VITALS — OXYGEN SATURATION: 100 %

## 2022-01-21 VITALS — OXYGEN SATURATION: 99 %

## 2022-01-21 VITALS — OXYGEN SATURATION: 91 %

## 2022-01-21 VITALS — OXYGEN SATURATION: 97 %

## 2022-01-21 VITALS — OXYGEN SATURATION: 90 %

## 2022-01-21 VITALS — OXYGEN SATURATION: 85 %

## 2022-01-21 VITALS — OXYGEN SATURATION: 96 %

## 2022-01-21 VITALS — OXYGEN SATURATION: 94 %

## 2022-01-21 VITALS — OXYGEN SATURATION: 95 %

## 2022-01-21 VITALS — OXYGEN SATURATION: 93 %

## 2022-01-21 VITALS — OXYGEN SATURATION: 86 %

## 2022-01-21 VITALS — OXYGEN SATURATION: 87 %

## 2022-01-21 VITALS — OXYGEN SATURATION: 88 %

## 2022-01-21 VITALS — OXYGEN SATURATION: 92 %

## 2022-01-21 VITALS — OXYGEN SATURATION: 84 %

## 2022-01-21 VITALS — OXYGEN SATURATION: 89 %

## 2022-01-21 VITALS — OXYGEN SATURATION: 98 %

## 2022-01-21 VITALS
DIASTOLIC BLOOD PRESSURE: 84 MMHG | HEART RATE: 96 BPM | OXYGEN SATURATION: 91 % | SYSTOLIC BLOOD PRESSURE: 144 MMHG | TEMPERATURE: 99.7 F

## 2022-01-21 VITALS — OXYGEN SATURATION: 83 %

## 2022-01-21 VITALS
TEMPERATURE: 98.2 F | HEART RATE: 89 BPM | DIASTOLIC BLOOD PRESSURE: 81 MMHG | OXYGEN SATURATION: 98 % | SYSTOLIC BLOOD PRESSURE: 143 MMHG

## 2022-01-21 VITALS
HEART RATE: 92 BPM | DIASTOLIC BLOOD PRESSURE: 75 MMHG | SYSTOLIC BLOOD PRESSURE: 134 MMHG | TEMPERATURE: 98.6 F | OXYGEN SATURATION: 98 %

## 2022-01-21 VITALS — SYSTOLIC BLOOD PRESSURE: 127 MMHG | DIASTOLIC BLOOD PRESSURE: 77 MMHG | HEART RATE: 92 BPM | TEMPERATURE: 99.1 F

## 2022-01-21 VITALS — OXYGEN SATURATION: 69 %

## 2022-01-21 VITALS — OXYGEN SATURATION: 82 %

## 2022-01-21 VITALS
DIASTOLIC BLOOD PRESSURE: 99 MMHG | HEART RATE: 108 BPM | TEMPERATURE: 98.7 F | SYSTOLIC BLOOD PRESSURE: 169 MMHG | OXYGEN SATURATION: 82 %

## 2022-01-21 VITALS — HEART RATE: 96 BPM | SYSTOLIC BLOOD PRESSURE: 157 MMHG | TEMPERATURE: 99 F | DIASTOLIC BLOOD PRESSURE: 99 MMHG

## 2022-01-21 VITALS — OXYGEN SATURATION: 75 %

## 2022-01-21 VITALS — OXYGEN SATURATION: 81 %

## 2022-01-21 VITALS — OXYGEN SATURATION: 80 %

## 2022-01-21 VITALS — OXYGEN SATURATION: 77 %

## 2022-01-21 VITALS — OXYGEN SATURATION: 74 %

## 2022-01-21 VITALS — OXYGEN SATURATION: 66 %

## 2022-01-21 LAB
ANION GAP SERPL CALC-SCNC: 19 MMOL/L (ref 7–16)
BASE EXCESS BLDA CALC-SCNC: -2.5 MMOL/L (ref -2–2)
BASOPHILS # BLD: 0 K/MM3 (ref 0–0.2)
BASOPHILS NFR BLD AUTO: 0.2 % (ref 0–2)
BUN SERPL-MCNC: 96 MG/DL (ref 8–26)
CALCIUM SERPL-MCNC: 8.6 MG/DL (ref 8.4–10.2)
CHLORIDE SERPL-SCNC: 102 MMOL/L (ref 98–107)
CO2 BLDA-SCNC: 23.5 MMOL/L
CO2 SERPL-SCNC: 22 MMOL/L (ref 22–29)
CREAT SERPL-SCNC: 8.89 MG/DL (ref 0.72–1.25)
EOSINOPHIL # BLD: 0.3 K/MM3 (ref 0–0.7)
EOSINOPHIL NFR BLD: 2.3 % (ref 0–4)
ERYTHROCYTE [DISTWIDTH] IN BLOOD BY AUTOMATED COUNT: 14.4 % (ref 11.5–14.5)
GLUCOSE SERPL-MCNC: 99 MG/DL (ref 70–99)
GRANULOCYTES # BLD AUTO: 74.6 % (ref 42.2–75.2)
HCO3 BLDA-SCNC: 22.3 MEQ/L (ref 22–26)
HCT VFR BLD AUTO: 29 % (ref 42–52)
HGB BLD-MCNC: 8.7 G/DL (ref 13.5–18)
INHALED O2 CONCENTRATION: 60 %
LYMPHOCYTES # BLD: 1.4 K/MM3 (ref 1.2–3.4)
LYMPHOCYTES NFR BLD: 11.8 % (ref 20–51)
MCH RBC QN AUTO: 27 PG (ref 27–31)
MCHC RBC AUTO-ENTMCNC: 30 G/DL (ref 33–37)
MCV RBC AUTO: 92 FL (ref 80–100)
MONOCYTES # BLD: 1.2 K/MM3 (ref 0.1–0.6)
MONOCYTES NFR BLD AUTO: 10.3 % (ref 1.7–9.3)
NEUTROPHILS # BLD: 8.7 K/MM3 (ref 1.4–6.5)
PCO2 BLDA: 38.8 MMHG (ref 35–45)
PLATELET # BLD AUTO: 210 K/MM3 (ref 130–400)
PMV BLD AUTO: 10.4 FL (ref 7.4–10.4)
PO2 BLDA: 89 MMHG (ref 80–100)
POTASSIUM SERPL-SCNC: 4.8 MMOL/L (ref 3.5–4.5)
RBC # BLD AUTO: 3.17 M/MM3 (ref 4.2–5.6)
SAO2 % BLDA: 96.1 % (ref 92–100)
SODIUM SERPL-SCNC: 143 MMOL/L (ref 136–145)

## 2022-01-21 PROCEDURE — 0DH63UZ INSERTION OF FEEDING DEVICE INTO STOMACH, PERCUTANEOUS APPROACH: ICD-10-PCS | Performed by: STUDENT IN AN ORGANIZED HEALTH CARE EDUCATION/TRAINING PROGRAM

## 2022-01-21 NOTE — NUR
PATIENT RESTING INTERMITTENTLY THIS SHIFT, NOTED AT TIMES WILL SHAKE HIS LEG
OR TAP HIS FOOT BUT DOES NOT APPEAR AGGITATED SEDATION VACATION INITIATED AT
THIS TIME

## 2022-01-21 NOTE — NUR
PATIENT RECEIVED 1 HOUR & 5 MIN S OF TX ONLY DUE TO POOR BFR & UNABLE TO
RUN TX. PATIENT GIVEN 349 ML OF FLUID. NEXT PLANNED HD TX PENDING REPLACEMENT
OF NONTUNNELED CATHETER WIRED OVER EXISTING SITE. REPORT GIVEN TO CATH LAB &
ICU NURSE CHERRIE.

## 2022-01-21 NOTE — NUR
Patient had PEG tube placed and non-tunneled dialysis catheter rewired and
replaced today. Patient tolerated procedures well, and is currently undergoing
an EEG.

## 2022-01-21 NOTE — NUR
PROPOFOL DRIP INITIATED BEFORE PEG TUBE PLACEMENT, AS PLACEMENT WAS DONE
BEDSIDE AND PATIENT WAS AGITATED AND RESTLESS; INITIATED PER DR. BAGLEY

## 2022-01-21 NOTE — NUR
Sedation vacation not done today, as patient is very agitated and restless,
even while on sedation. Patient constantly moves his legs around, and kicks
down at the foot board; did not feel it would be safe to lower sedation.

## 2022-01-22 VITALS — OXYGEN SATURATION: 88 %

## 2022-01-22 VITALS — OXYGEN SATURATION: 97 %

## 2022-01-22 VITALS — OXYGEN SATURATION: 99 %

## 2022-01-22 VITALS — OXYGEN SATURATION: 95 %

## 2022-01-22 VITALS — OXYGEN SATURATION: 93 %

## 2022-01-22 VITALS — OXYGEN SATURATION: 96 %

## 2022-01-22 VITALS — OXYGEN SATURATION: 98 %

## 2022-01-22 VITALS — OXYGEN SATURATION: 94 %

## 2022-01-22 VITALS — OXYGEN SATURATION: 100 %

## 2022-01-22 VITALS — HEART RATE: 89 BPM | SYSTOLIC BLOOD PRESSURE: 150 MMHG | DIASTOLIC BLOOD PRESSURE: 109 MMHG

## 2022-01-22 VITALS — SYSTOLIC BLOOD PRESSURE: 150 MMHG | TEMPERATURE: 98.4 F | DIASTOLIC BLOOD PRESSURE: 94 MMHG | HEART RATE: 98 BPM

## 2022-01-22 VITALS — HEART RATE: 93 BPM | DIASTOLIC BLOOD PRESSURE: 78 MMHG | TEMPERATURE: 98.8 F | SYSTOLIC BLOOD PRESSURE: 132 MMHG

## 2022-01-22 VITALS — OXYGEN SATURATION: 89 %

## 2022-01-22 VITALS — OXYGEN SATURATION: 79 %

## 2022-01-22 VITALS
SYSTOLIC BLOOD PRESSURE: 126 MMHG | OXYGEN SATURATION: 99 % | HEART RATE: 80 BPM | DIASTOLIC BLOOD PRESSURE: 83 MMHG | TEMPERATURE: 98.2 F

## 2022-01-22 VITALS — SYSTOLIC BLOOD PRESSURE: 150 MMHG | HEART RATE: 101 BPM | TEMPERATURE: 98.9 F | DIASTOLIC BLOOD PRESSURE: 92 MMHG

## 2022-01-22 VITALS — SYSTOLIC BLOOD PRESSURE: 162 MMHG | DIASTOLIC BLOOD PRESSURE: 94 MMHG | HEART RATE: 99 BPM

## 2022-01-22 VITALS — HEART RATE: 90 BPM | TEMPERATURE: 98.2 F | DIASTOLIC BLOOD PRESSURE: 101 MMHG | SYSTOLIC BLOOD PRESSURE: 163 MMHG

## 2022-01-22 VITALS — HEART RATE: 93 BPM | DIASTOLIC BLOOD PRESSURE: 85 MMHG | SYSTOLIC BLOOD PRESSURE: 135 MMHG | TEMPERATURE: 98.5 F

## 2022-01-22 VITALS — SYSTOLIC BLOOD PRESSURE: 146 MMHG | DIASTOLIC BLOOD PRESSURE: 90 MMHG | HEART RATE: 99 BPM

## 2022-01-22 VITALS — OXYGEN SATURATION: 91 %

## 2022-01-22 VITALS — OXYGEN SATURATION: 87 %

## 2022-01-22 VITALS — DIASTOLIC BLOOD PRESSURE: 106 MMHG | SYSTOLIC BLOOD PRESSURE: 158 MMHG | HEART RATE: 103 BPM

## 2022-01-22 VITALS — OXYGEN SATURATION: 92 %

## 2022-01-22 VITALS — OXYGEN SATURATION: 90 %

## 2022-01-22 VITALS — OXYGEN SATURATION: 86 %

## 2022-01-22 LAB
ALBUMIN SERPL-MCNC: 2 GM/DL (ref 3.5–5)
ALP SERPL-CCNC: 54 U/L (ref 40–150)
ALT SERPL-CCNC: 22 U/L (ref 0–55)
ANION GAP SERPL CALC-SCNC: 20 MMOL/L (ref 7–16)
AST SERPL-CCNC: 26 U/L (ref 5–34)
BASE EXCESS BLDA CALC-SCNC: -3.3 MMOL/L (ref -2–2)
BASOPHILS # BLD: 0 K/MM3 (ref 0–0.2)
BASOPHILS NFR BLD AUTO: 0.2 % (ref 0–2)
BILIRUB SERPL-MCNC: 0.4 MG/DL (ref 0.2–1.2)
BUN SERPL-MCNC: 95 MG/DL (ref 8–26)
CALCIUM SERPL-MCNC: 8.6 MG/DL (ref 8.4–10.2)
CHLORIDE SERPL-SCNC: 106 MMOL/L (ref 98–107)
CO2 BLDA-SCNC: 22.3 MMOL/L
CO2 SERPL-SCNC: 19 MMOL/L (ref 22–29)
CREAT SERPL-SCNC: 8.45 MG/DL (ref 0.72–1.25)
EOSINOPHIL # BLD: 0.2 K/MM3 (ref 0–0.7)
EOSINOPHIL NFR BLD: 2.3 % (ref 0–4)
ERYTHROCYTE [DISTWIDTH] IN BLOOD BY AUTOMATED COUNT: 14.6 % (ref 11.5–14.5)
GLUCOSE SERPL-MCNC: 82 MG/DL (ref 70–99)
GRANULOCYTES # BLD AUTO: 71 % (ref 42.2–75.2)
HCO3 BLDA-SCNC: 21.2 MEQ/L (ref 22–26)
HCT VFR BLD AUTO: 28.7 % (ref 42–52)
HGB BLD-MCNC: 8.4 G/DL (ref 13.5–18)
INHALED O2 CONCENTRATION: 50 %
LYMPHOCYTES # BLD: 1.4 K/MM3 (ref 1.2–3.4)
LYMPHOCYTES NFR BLD: 14.3 % (ref 20–51)
MCH RBC QN AUTO: 27 PG (ref 27–31)
MCHC RBC AUTO-ENTMCNC: 29 G/DL (ref 33–37)
MCV RBC AUTO: 91 FL (ref 80–100)
MONOCYTES # BLD: 1.1 K/MM3 (ref 0.1–0.6)
MONOCYTES NFR BLD AUTO: 11.4 % (ref 1.7–9.3)
NEUTROPHILS # BLD: 7 K/MM3 (ref 1.4–6.5)
PCO2 BLDA: 36 MMHG (ref 35–45)
PLATELET # BLD AUTO: 227 K/MM3 (ref 130–400)
PMV BLD AUTO: 10.1 FL (ref 7.4–10.4)
PO2 BLDA: 72.3 MMHG (ref 80–100)
POTASSIUM SERPL-SCNC: 5 MMOL/L (ref 3.5–4.5)
PROT SERPL-MCNC: 7 GM/DL (ref 6.2–8.1)
RBC # BLD AUTO: 3.15 M/MM3 (ref 4.2–5.6)
SAO2 % BLDA: 93 % (ref 92–100)
SODIUM SERPL-SCNC: 145 MMOL/L (ref 136–145)

## 2022-01-22 NOTE — NUR
SEDATION VACAATION INITIATED DECREASING MEDICATION DOSES BY ONE HALF, PATIENT
HAS RESTED QUITELY THIS SHIFT WITH EYES CLOSED RESPONDS TO STIMULI BUT FALLS
EASILY BACK TO SLEEP

## 2022-01-22 NOTE — NUR
PATIENT TOLERATED HD TX WITH BFR @ 200 WITH NEW OVERWIRED CATHETER. REMOVED
900 ML OF FLUID. NEXT PLANNED HD TX ON MONDAY 1/24/22 AFTER NEW CATHETER
PLACEMENT EITHER NONTUNNELED OR TUNNELED PENDING.

## 2022-01-22 NOTE — NUR
RECEIVED BEDSIDE SHIFT REPORT FROM NATAN FELIX. PATIENT CURRENTLY ON A SEDATION
VACATION. STILL ON MINIMAL SEDATION WITH TRACHEOSTOMY AND PEG TUBE IN PLACE.
INTENDING TO RESTART TUBE FEEDING TODAY. STILL HAS DIALYSIS CATHETER, WHITMORE
CATHETER AND RIGHT INTERNAL JUGULAR TRIPLE LUMEN CATHETER STILL IN PLACE.
RESTING BUT FIDGETING IN BED WITH EYES CLOSED. SEE GTT TITRATION FLOWSHEET.

## 2022-01-23 VITALS — OXYGEN SATURATION: 96 %

## 2022-01-23 VITALS — HEART RATE: 90 BPM | SYSTOLIC BLOOD PRESSURE: 144 MMHG | DIASTOLIC BLOOD PRESSURE: 89 MMHG | OXYGEN SATURATION: 91 %

## 2022-01-23 VITALS — OXYGEN SATURATION: 87 %

## 2022-01-23 VITALS — OXYGEN SATURATION: 97 %

## 2022-01-23 VITALS — OXYGEN SATURATION: 99 %

## 2022-01-23 VITALS — OXYGEN SATURATION: 95 %

## 2022-01-23 VITALS — OXYGEN SATURATION: 100 %

## 2022-01-23 VITALS — OXYGEN SATURATION: 93 %

## 2022-01-23 VITALS — OXYGEN SATURATION: 92 %

## 2022-01-23 VITALS — OXYGEN SATURATION: 100 % | HEART RATE: 86 BPM | DIASTOLIC BLOOD PRESSURE: 79 MMHG | SYSTOLIC BLOOD PRESSURE: 130 MMHG

## 2022-01-23 VITALS — OXYGEN SATURATION: 94 %

## 2022-01-23 VITALS — OXYGEN SATURATION: 98 %

## 2022-01-23 VITALS
TEMPERATURE: 98.6 F | OXYGEN SATURATION: 99 % | DIASTOLIC BLOOD PRESSURE: 86 MMHG | HEART RATE: 84 BPM | SYSTOLIC BLOOD PRESSURE: 130 MMHG

## 2022-01-23 VITALS — OXYGEN SATURATION: 97 % | SYSTOLIC BLOOD PRESSURE: 120 MMHG | DIASTOLIC BLOOD PRESSURE: 80 MMHG | HEART RATE: 83 BPM

## 2022-01-23 VITALS — OXYGEN SATURATION: 97 % | HEART RATE: 77 BPM | SYSTOLIC BLOOD PRESSURE: 117 MMHG | DIASTOLIC BLOOD PRESSURE: 76 MMHG

## 2022-01-23 VITALS — SYSTOLIC BLOOD PRESSURE: 154 MMHG | DIASTOLIC BLOOD PRESSURE: 90 MMHG | HEART RATE: 99 BPM

## 2022-01-23 VITALS — SYSTOLIC BLOOD PRESSURE: 170 MMHG | HEART RATE: 100 BPM | DIASTOLIC BLOOD PRESSURE: 112 MMHG

## 2022-01-23 VITALS — OXYGEN SATURATION: 63 %

## 2022-01-23 VITALS — OXYGEN SATURATION: 91 %

## 2022-01-23 VITALS
TEMPERATURE: 98.4 F | HEART RATE: 76 BPM | OXYGEN SATURATION: 98 % | DIASTOLIC BLOOD PRESSURE: 84 MMHG | SYSTOLIC BLOOD PRESSURE: 129 MMHG

## 2022-01-23 VITALS — HEART RATE: 84 BPM | DIASTOLIC BLOOD PRESSURE: 81 MMHG | SYSTOLIC BLOOD PRESSURE: 123 MMHG

## 2022-01-23 VITALS — OXYGEN SATURATION: 89 %

## 2022-01-23 VITALS — OXYGEN SATURATION: 98 % | HEART RATE: 79 BPM | SYSTOLIC BLOOD PRESSURE: 123 MMHG | DIASTOLIC BLOOD PRESSURE: 84 MMHG

## 2022-01-23 VITALS — OXYGEN SATURATION: 71 %

## 2022-01-23 VITALS — OXYGEN SATURATION: 90 %

## 2022-01-23 VITALS — OXYGEN SATURATION: 79 %

## 2022-01-23 VITALS — DIASTOLIC BLOOD PRESSURE: 90 MMHG | SYSTOLIC BLOOD PRESSURE: 154 MMHG | HEART RATE: 99 BPM

## 2022-01-23 VITALS — SYSTOLIC BLOOD PRESSURE: 121 MMHG | HEART RATE: 80 BPM | TEMPERATURE: 98.3 F | DIASTOLIC BLOOD PRESSURE: 77 MMHG

## 2022-01-23 VITALS — DIASTOLIC BLOOD PRESSURE: 80 MMHG | HEART RATE: 82 BPM | SYSTOLIC BLOOD PRESSURE: 122 MMHG

## 2022-01-23 VITALS — OXYGEN SATURATION: 57 %

## 2022-01-23 VITALS
SYSTOLIC BLOOD PRESSURE: 138 MMHG | TEMPERATURE: 98.4 F | OXYGEN SATURATION: 98 % | HEART RATE: 85 BPM | DIASTOLIC BLOOD PRESSURE: 96 MMHG

## 2022-01-23 VITALS — HEART RATE: 92 BPM | DIASTOLIC BLOOD PRESSURE: 109 MMHG | SYSTOLIC BLOOD PRESSURE: 174 MMHG

## 2022-01-23 VITALS — OXYGEN SATURATION: 82 %

## 2022-01-23 VITALS — DIASTOLIC BLOOD PRESSURE: 100 MMHG | TEMPERATURE: 98.8 F | SYSTOLIC BLOOD PRESSURE: 167 MMHG | HEART RATE: 115 BPM

## 2022-01-23 VITALS — OXYGEN SATURATION: 75 %

## 2022-01-23 VITALS — OXYGEN SATURATION: 44 %

## 2022-01-23 VITALS — OXYGEN SATURATION: 54 %

## 2022-01-23 VITALS — OXYGEN SATURATION: 86 %

## 2022-01-23 VITALS — OXYGEN SATURATION: 88 %

## 2022-01-23 VITALS — OXYGEN SATURATION: 74 %

## 2022-01-23 VITALS — OXYGEN SATURATION: 81 %

## 2022-01-23 LAB
ALBUMIN SERPL-MCNC: 2 GM/DL (ref 3.5–5)
ALP SERPL-CCNC: 62 U/L (ref 40–150)
ALT SERPL-CCNC: 15 U/L (ref 0–55)
ANION GAP SERPL CALC-SCNC: 16 MMOL/L (ref 7–16)
AST SERPL-CCNC: 23 U/L (ref 5–34)
BASE EXCESS BLDA CALC-SCNC: -4.3 MMOL/L (ref -2–2)
BASOPHILS # BLD: 0 K/MM3 (ref 0–0.2)
BASOPHILS NFR BLD AUTO: 0.2 % (ref 0–2)
BILIRUB SERPL-MCNC: 0.4 MG/DL (ref 0.2–1.2)
BUN SERPL-MCNC: 77 MG/DL (ref 8–26)
CALCIUM SERPL-MCNC: 8.7 MG/DL (ref 8.4–10.2)
CHLORIDE SERPL-SCNC: 108 MMOL/L (ref 98–107)
CO2 BLDA-SCNC: 21.7 MMOL/L
CO2 SERPL-SCNC: 22 MMOL/L (ref 22–29)
CREAT SERPL-SCNC: 6.94 MG/DL (ref 0.72–1.25)
EOSINOPHIL # BLD: 0.3 K/MM3 (ref 0–0.7)
EOSINOPHIL NFR BLD: 3.4 % (ref 0–4)
ERYTHROCYTE [DISTWIDTH] IN BLOOD BY AUTOMATED COUNT: 14.2 % (ref 11.5–14.5)
GLUCOSE SERPL-MCNC: 95 MG/DL (ref 70–99)
GRANULOCYTES # BLD AUTO: 69.1 % (ref 42.2–75.2)
HCO3 BLDA-SCNC: 20.5 MEQ/L (ref 22–26)
HCT VFR BLD AUTO: 27.2 % (ref 42–52)
HGB BLD-MCNC: 8.2 G/DL (ref 13.5–18)
INHALED O2 CONCENTRATION: 50 %
LYMPHOCYTES # BLD: 1.5 K/MM3 (ref 1.2–3.4)
LYMPHOCYTES NFR BLD: 16 % (ref 20–51)
MCH RBC QN AUTO: 28 PG (ref 27–31)
MCHC RBC AUTO-ENTMCNC: 30 G/DL (ref 33–37)
MCV RBC AUTO: 92 FL (ref 80–100)
MONOCYTES # BLD: 1 K/MM3 (ref 0.1–0.6)
MONOCYTES NFR BLD AUTO: 10.6 % (ref 1.7–9.3)
NEUTROPHILS # BLD: 6.4 K/MM3 (ref 1.4–6.5)
PCO2 BLDA: 36.5 MMHG (ref 35–45)
PLATELET # BLD AUTO: 234 K/MM3 (ref 130–400)
PMV BLD AUTO: 10.1 FL (ref 7.4–10.4)
PO2 BLDA: 67.1 MMHG (ref 80–100)
POTASSIUM SERPL-SCNC: 4.1 MMOL/L (ref 3.5–4.5)
PROT SERPL-MCNC: 6.9 GM/DL (ref 6.2–8.1)
RBC # BLD AUTO: 2.95 M/MM3 (ref 4.2–5.6)
SAO2 % BLDA: 92.4 % (ref 92–100)
SODIUM SERPL-SCNC: 146 MMOL/L (ref 136–145)

## 2022-01-23 NOTE — NUR
DR. JACKSON AT BEDSIDE. DISCUSSED NEED FOR SEROQUEL. AGREEABLE FOR TWICE DAILY.
ORDERS RECEIVED AND PLAN OF CARE DISCUSSED.

## 2022-01-23 NOTE — NUR
RECEIVED BEDSIDE SHIFT REPORT FROM NATAN FELIX. PATIENT IN BED, RESTLESS AND
MOVING TO CALM SELF. SEE GTT TITRATION FLOWSHEET. STILL SEDATED WITH TRACH AND
PEG TUBE IN PLACE. RIGHT UPPER ARM PICC AND WHITMORE CATHETER STILL IN PLACE.
RECEIVED [facility] MANAGEMENT SYSTEM LAST NIGHT. NEEDS REPLACED. WILL ASSESS.

## 2022-01-23 NOTE — NUR
PATIENT EXTREMELY AGGITATED, KICKING FEET, SCOOTING SELF DOWN IN BED,
ATTEMPTING TO PULL AT TUBES (WHITMORE, PEG TUBE, VENT TUBING) INCREASED BP NOTED,
NON MEDICAL INTERVENTIONS UNSUCCESSFUL INCLUDING REPOSITIONING, SUCTIONING
TITRATION OF SEDATION MEDICATIONS TO ATTAIN RESTFUL STATE AT THIS TIME.

## 2022-01-23 NOTE — NUR
PATIENT AGGITATED AND SCOOTING SELF DOWN IN BED, TAKING FEET AND ATTEMPTING TO
REMOVE FLEXI-SEAL AND WHITMORE CATHETER, ATTEMPTING TO REACH FOR TRACH WITH
HANDS, UNABLE TO REDIRECT, REPOSITIONING INEFFECTIVE TITRATION OF MEDICATION
AT THIS TIME.

## 2022-01-24 VITALS — OXYGEN SATURATION: 100 %

## 2022-01-24 VITALS — OXYGEN SATURATION: 99 %

## 2022-01-24 VITALS — OXYGEN SATURATION: 98 %

## 2022-01-24 VITALS — OXYGEN SATURATION: 97 %

## 2022-01-24 VITALS — OXYGEN SATURATION: 59 %

## 2022-01-24 VITALS — OXYGEN SATURATION: 96 %

## 2022-01-24 VITALS — OXYGEN SATURATION: 93 %

## 2022-01-24 VITALS — OXYGEN SATURATION: 95 %

## 2022-01-24 VITALS — OXYGEN SATURATION: 94 %

## 2022-01-24 VITALS
TEMPERATURE: 98.8 F | OXYGEN SATURATION: 93 % | SYSTOLIC BLOOD PRESSURE: 172 MMHG | DIASTOLIC BLOOD PRESSURE: 85 MMHG | HEART RATE: 103 BPM

## 2022-01-24 VITALS — OXYGEN SATURATION: 90 %

## 2022-01-24 VITALS — DIASTOLIC BLOOD PRESSURE: 78 MMHG | TEMPERATURE: 97.8 F | HEART RATE: 77 BPM | SYSTOLIC BLOOD PRESSURE: 121 MMHG

## 2022-01-24 VITALS — OXYGEN SATURATION: 92 %

## 2022-01-24 VITALS — OXYGEN SATURATION: 86 %

## 2022-01-24 VITALS — TEMPERATURE: 98.6 F | HEART RATE: 73 BPM | DIASTOLIC BLOOD PRESSURE: 66 MMHG | SYSTOLIC BLOOD PRESSURE: 119 MMHG

## 2022-01-24 VITALS — HEART RATE: 83 BPM | TEMPERATURE: 98.8 F | SYSTOLIC BLOOD PRESSURE: 134 MMHG | DIASTOLIC BLOOD PRESSURE: 77 MMHG

## 2022-01-24 VITALS — OXYGEN SATURATION: 88 %

## 2022-01-24 VITALS — OXYGEN SATURATION: 87 %

## 2022-01-24 VITALS — OXYGEN SATURATION: 78 %

## 2022-01-24 VITALS — OXYGEN SATURATION: 85 %

## 2022-01-24 VITALS — OXYGEN SATURATION: 89 %

## 2022-01-24 VITALS — OXYGEN SATURATION: 67 %

## 2022-01-24 VITALS — OXYGEN SATURATION: 84 %

## 2022-01-24 VITALS — SYSTOLIC BLOOD PRESSURE: 122 MMHG | TEMPERATURE: 98.5 F | DIASTOLIC BLOOD PRESSURE: 65 MMHG | HEART RATE: 74 BPM

## 2022-01-24 VITALS — OXYGEN SATURATION: 77 %

## 2022-01-24 VITALS — OXYGEN SATURATION: 79 %

## 2022-01-24 VITALS — OXYGEN SATURATION: 91 %

## 2022-01-24 VITALS — OXYGEN SATURATION: 80 %

## 2022-01-24 LAB
ALBUMIN SERPL-MCNC: 2.1 GM/DL (ref 3.5–5)
ANION GAP SERPL CALC-SCNC: 17 MMOL/L (ref 7–16)
ANION GAP SERPL CALC-SCNC: 19 MMOL/L (ref 7–16)
BASOPHILS # BLD: 0 K/MM3 (ref 0–0.2)
BASOPHILS NFR BLD AUTO: 0.1 % (ref 0–2)
BUN SERPL-MCNC: 84 MG/DL (ref 8–26)
BUN SERPL-MCNC: 88 MG/DL (ref 8–26)
CALCIUM SERPL-MCNC: 9 MG/DL (ref 8.4–10.2)
CALCIUM SERPL-MCNC: 9.4 MG/DL (ref 8.4–10.2)
CHLORIDE SERPL-SCNC: 110 MMOL/L (ref 98–107)
CHLORIDE SERPL-SCNC: 113 MMOL/L (ref 98–107)
CO2 SERPL-SCNC: 20 MMOL/L (ref 22–29)
CO2 SERPL-SCNC: 21 MMOL/L (ref 22–29)
CREAT SERPL-SCNC: 6.95 MG/DL (ref 0.72–1.25)
CREAT SERPL-SCNC: 7.18 MG/DL (ref 0.72–1.25)
EOSINOPHIL # BLD: 0.3 K/MM3 (ref 0–0.7)
EOSINOPHIL NFR BLD: 4.2 % (ref 0–4)
ERYTHROCYTE [DISTWIDTH] IN BLOOD BY AUTOMATED COUNT: 14.4 % (ref 11.5–14.5)
GLUCOSE SERPL-MCNC: 116 MG/DL (ref 70–99)
GLUCOSE SERPL-MCNC: 130 MG/DL (ref 70–99)
GRANULOCYTES # BLD AUTO: 68.7 % (ref 42.2–75.2)
HCT VFR BLD AUTO: 27 % (ref 42–52)
HGB BLD-MCNC: 8.2 G/DL (ref 13.5–18)
LYMPHOCYTES # BLD: 1.4 K/MM3 (ref 1.2–3.4)
LYMPHOCYTES NFR BLD: 17.6 % (ref 20–51)
MCH RBC QN AUTO: 28 PG (ref 27–31)
MCHC RBC AUTO-ENTMCNC: 30 G/DL (ref 33–37)
MCV RBC AUTO: 93 FL (ref 80–100)
MONOCYTES # BLD: 0.7 K/MM3 (ref 0.1–0.6)
MONOCYTES NFR BLD AUTO: 8.8 % (ref 1.7–9.3)
NEUTROPHILS # BLD: 5.5 K/MM3 (ref 1.4–6.5)
PHOSPHATE SERPL-MCNC: 8.9 MG/DL (ref 2.3–4.7)
PLATELET # BLD AUTO: 260 K/MM3 (ref 130–400)
PMV BLD AUTO: 10.2 FL (ref 7.4–10.4)
POTASSIUM SERPL-SCNC: 3.7 MMOL/L (ref 3.5–4.5)
POTASSIUM SERPL-SCNC: 3.9 MMOL/L (ref 3.5–4.5)
RBC # BLD AUTO: 2.92 M/MM3 (ref 4.2–5.6)
SODIUM SERPL-SCNC: 149 MMOL/L (ref 136–145)
SODIUM SERPL-SCNC: 151 MMOL/L (ref 136–145)

## 2022-01-24 NOTE — NUR
Sedation decreased for Sedation Vacation - pt woke, became restless, moved all
extremities, unable to follow commands - sedation increased to previous rate
per protocol

## 2022-01-25 VITALS — OXYGEN SATURATION: 100 %

## 2022-01-25 VITALS — OXYGEN SATURATION: 96 %

## 2022-01-25 VITALS — OXYGEN SATURATION: 99 %

## 2022-01-25 VITALS — OXYGEN SATURATION: 94 %

## 2022-01-25 VITALS — OXYGEN SATURATION: 89 %

## 2022-01-25 VITALS — OXYGEN SATURATION: 95 %

## 2022-01-25 VITALS — OXYGEN SATURATION: 98 %

## 2022-01-25 VITALS — OXYGEN SATURATION: 91 %

## 2022-01-25 VITALS
HEART RATE: 77 BPM | SYSTOLIC BLOOD PRESSURE: 113 MMHG | DIASTOLIC BLOOD PRESSURE: 65 MMHG | TEMPERATURE: 98.2 F | OXYGEN SATURATION: 100 %

## 2022-01-25 VITALS — OXYGEN SATURATION: 92 %

## 2022-01-25 VITALS — OXYGEN SATURATION: 93 %

## 2022-01-25 VITALS — OXYGEN SATURATION: 97 %

## 2022-01-25 VITALS — DIASTOLIC BLOOD PRESSURE: 98 MMHG | TEMPERATURE: 99.2 F | HEART RATE: 99 BPM | SYSTOLIC BLOOD PRESSURE: 155 MMHG

## 2022-01-25 VITALS — OXYGEN SATURATION: 88 %

## 2022-01-25 VITALS — SYSTOLIC BLOOD PRESSURE: 121 MMHG | DIASTOLIC BLOOD PRESSURE: 71 MMHG | HEART RATE: 81 BPM | TEMPERATURE: 98 F

## 2022-01-25 VITALS
OXYGEN SATURATION: 98 % | SYSTOLIC BLOOD PRESSURE: 151 MMHG | TEMPERATURE: 98 F | DIASTOLIC BLOOD PRESSURE: 105 MMHG | HEART RATE: 89 BPM

## 2022-01-25 VITALS — OXYGEN SATURATION: 87 %

## 2022-01-25 VITALS — OXYGEN SATURATION: 86 %

## 2022-01-25 LAB
ANION GAP SERPL CALC-SCNC: 18 MMOL/L (ref 7–16)
BASOPHILS # BLD: 0 K/MM3 (ref 0–0.2)
BASOPHILS NFR BLD AUTO: 0.2 % (ref 0–2)
BUN SERPL-MCNC: 89 MG/DL (ref 8–26)
CALCIUM SERPL-MCNC: 9.5 MG/DL (ref 8.4–10.2)
CHLORIDE SERPL-SCNC: 113 MMOL/L (ref 98–107)
CO2 SERPL-SCNC: 22 MMOL/L (ref 22–29)
CREAT SERPL-SCNC: 7.11 MG/DL (ref 0.72–1.25)
EOSINOPHIL # BLD: 0.3 K/MM3 (ref 0–0.7)
EOSINOPHIL NFR BLD: 3 % (ref 0–4)
ERYTHROCYTE [DISTWIDTH] IN BLOOD BY AUTOMATED COUNT: 14.7 % (ref 11.5–14.5)
GLUCOSE SERPL-MCNC: 125 MG/DL (ref 70–99)
GRANULOCYTES # BLD AUTO: 69.9 % (ref 42.2–75.2)
HCT VFR BLD AUTO: 29.4 % (ref 42–52)
HGB BLD-MCNC: 8.5 G/DL (ref 13.5–18)
LYMPHOCYTES # BLD: 1.8 K/MM3 (ref 1.2–3.4)
LYMPHOCYTES NFR BLD: 19.1 % (ref 20–51)
MCH RBC QN AUTO: 28 PG (ref 27–31)
MCHC RBC AUTO-ENTMCNC: 29 G/DL (ref 33–37)
MCV RBC AUTO: 96 FL (ref 80–100)
MONOCYTES # BLD: 0.7 K/MM3 (ref 0.1–0.6)
MONOCYTES NFR BLD AUTO: 7 % (ref 1.7–9.3)
NEUTROPHILS # BLD: 6.5 K/MM3 (ref 1.4–6.5)
PLATELET # BLD AUTO: 299 K/MM3 (ref 130–400)
PMV BLD AUTO: 10.5 FL (ref 7.4–10.4)
POTASSIUM SERPL-SCNC: 3.7 MMOL/L (ref 3.5–4.5)
RBC # BLD AUTO: 3.07 M/MM3 (ref 4.2–5.6)
SODIUM SERPL-SCNC: 153 MMOL/L (ref 136–145)

## 2022-01-25 PROCEDURE — 05HM33Z INSERTION OF INFUSION DEVICE INTO RIGHT INTERNAL JUGULAR VEIN, PERCUTANEOUS APPROACH: ICD-10-PCS | Performed by: STUDENT IN AN ORGANIZED HEALTH CARE EDUCATION/TRAINING PROGRAM

## 2022-01-25 PROCEDURE — 05PY33Z REMOVAL OF INFUSION DEVICE FROM UPPER VEIN, PERCUTANEOUS APPROACH: ICD-10-PCS | Performed by: STUDENT IN AN ORGANIZED HEALTH CARE EDUCATION/TRAINING PROGRAM

## 2022-01-25 PROCEDURE — 5A1D70Z PERFORMANCE OF URINARY FILTRATION, INTERMITTENT, LESS THAN 6 HOURS PER DAY: ICD-10-PCS | Performed by: STUDENT IN AN ORGANIZED HEALTH CARE EDUCATION/TRAINING PROGRAM

## 2022-01-25 NOTE — NUR
SEDATION VACATION NOT PERFORMED AT THIS TIME DUE TO PATIENT BEING EXTREMELY
AGGITATED THIS SHIFT REQUIREING THIS NURSE TO BE MAXED ON CURRENT SEDATION AND
OBTAINING ORDERS FOR ADDITIONAL MEDICATION TO KEEP PATIENT FROM HARMING
HIMSELF

## 2022-01-25 NOTE — NUR
PATIENT RECEIVED COMPLETE BED BATH JUST PRIOR TO THIS TIME, SHEETS CHANGED AND
REPOSITIONED IN BED, PATIENT AGITATTED AND SQUIRMED DOWN IN BED AND SIDEWAYS
SO THAT HE COULD REACH VENT TUBING AND REMOVE IT FROM HIS TRACH.  REPLACED ON
TRACH CONTINUES TO BE AGGITATED THIS NURSE NOTIFIED KERRI ULZ WITH NEW ORDERS
OBTAINED

## 2022-01-25 NOTE — NUR
Earnest, at [facility], reports that they have a bed available and are able to take
the patient today. SW notified the clinical team. SW updated the patient's
sister, Christina. Christina is in agreement to the plan.
 
The patient is to discharge today, 1/25, to [facility] in
[place]. Transportation was scheduled at 1845, via [facility]. SW
informed the patient's sister (Christina), the RN, and Earnest at [facility] of the
time. They were all in agreement to the time. The patient's sister, Christina,
gave  approval to sign the EMS Consent Form on her behalf. No additional
needs at this time.

## 2022-02-23 ENCOUNTER — HOSPITAL ENCOUNTER (INPATIENT)
Dept: HOSPITAL 19 - IPR | Age: 52
LOS: 7 days | Discharge: HOME | DRG: 70 | End: 2022-03-02
Attending: PHYSICAL MEDICINE & REHABILITATION | Admitting: PHYSICAL MEDICINE & REHABILITATION
Payer: MEDICARE

## 2022-02-23 VITALS — SYSTOLIC BLOOD PRESSURE: 123 MMHG | TEMPERATURE: 98.6 F | DIASTOLIC BLOOD PRESSURE: 73 MMHG | HEART RATE: 86 BPM

## 2022-02-23 VITALS — TEMPERATURE: 98.1 F | SYSTOLIC BLOOD PRESSURE: 126 MMHG | HEART RATE: 85 BPM | DIASTOLIC BLOOD PRESSURE: 65 MMHG

## 2022-02-23 VITALS — WEIGHT: 294.54 LBS | BODY MASS INDEX: 43.62 KG/M2 | HEIGHT: 69.02 IN

## 2022-02-23 DIAGNOSIS — I10: ICD-10-CM

## 2022-02-23 DIAGNOSIS — R19.7: ICD-10-CM

## 2022-02-23 DIAGNOSIS — M62.81: ICD-10-CM

## 2022-02-23 DIAGNOSIS — G47.00: ICD-10-CM

## 2022-02-23 DIAGNOSIS — Z93.1: ICD-10-CM

## 2022-02-23 DIAGNOSIS — R26.89: ICD-10-CM

## 2022-02-23 DIAGNOSIS — G40.909: ICD-10-CM

## 2022-02-23 DIAGNOSIS — Z79.2: ICD-10-CM

## 2022-02-23 DIAGNOSIS — J12.82: ICD-10-CM

## 2022-02-23 DIAGNOSIS — E66.01: ICD-10-CM

## 2022-02-23 DIAGNOSIS — U09.9: ICD-10-CM

## 2022-02-23 DIAGNOSIS — D64.89: ICD-10-CM

## 2022-02-23 DIAGNOSIS — N17.9: ICD-10-CM

## 2022-02-23 DIAGNOSIS — A41.01: ICD-10-CM

## 2022-02-23 DIAGNOSIS — G93.41: Primary | ICD-10-CM

## 2022-02-23 DIAGNOSIS — Z73.6: ICD-10-CM

## 2022-02-23 DIAGNOSIS — J96.01: ICD-10-CM

## 2022-02-23 DIAGNOSIS — R13.10: ICD-10-CM

## 2022-02-23 DIAGNOSIS — Z79.899: ICD-10-CM

## 2022-02-23 DIAGNOSIS — Z79.891: ICD-10-CM

## 2022-02-24 VITALS — HEART RATE: 97 BPM | SYSTOLIC BLOOD PRESSURE: 117 MMHG | TEMPERATURE: 98 F | DIASTOLIC BLOOD PRESSURE: 61 MMHG

## 2022-02-24 VITALS — HEART RATE: 80 BPM | DIASTOLIC BLOOD PRESSURE: 74 MMHG | TEMPERATURE: 98.5 F | SYSTOLIC BLOOD PRESSURE: 145 MMHG

## 2022-02-24 VITALS — SYSTOLIC BLOOD PRESSURE: 125 MMHG | DIASTOLIC BLOOD PRESSURE: 74 MMHG

## 2022-02-24 LAB
ALBUMIN SERPL-MCNC: 3.7 GM/DL (ref 3.5–5)
ALP SERPL-CCNC: 183 U/L (ref 40–150)
ALT SERPL-CCNC: 18 U/L (ref 0–55)
ANION GAP SERPL CALC-SCNC: 12 MMOL/L (ref 7–16)
AST SERPL-CCNC: 26 U/L (ref 5–34)
BASOPHILS # BLD: 0 K/MM3 (ref 0–0.2)
BASOPHILS NFR BLD AUTO: 0.4 % (ref 0–2)
BILIRUB SERPL-MCNC: 0.2 MG/DL (ref 0.2–1.2)
BUN SERPL-MCNC: 12 MG/DL (ref 8–26)
CALCIUM SERPL-MCNC: 9.6 MG/DL (ref 8.4–10.2)
CHLORIDE SERPL-SCNC: 98 MMOL/L (ref 98–107)
CO2 SERPL-SCNC: 27 MMOL/L (ref 22–29)
CREAT SERPL-SCNC: 0.79 MG/DL (ref 0.72–1.25)
EOSINOPHIL # BLD: 0.1 K/MM3 (ref 0–0.7)
EOSINOPHIL NFR BLD: 1.5 % (ref 0–4)
ERYTHROCYTE [DISTWIDTH] IN BLOOD BY AUTOMATED COUNT: 17 % (ref 11.5–14.5)
GLUCOSE SERPL-MCNC: 89 MG/DL (ref 70–99)
GRANULOCYTES # BLD AUTO: 61.6 % (ref 42.2–75.2)
HCT VFR BLD AUTO: 32 % (ref 42–52)
HGB BLD-MCNC: 10 G/DL (ref 13.5–18)
LYMPHOCYTES # BLD: 2.1 K/MM3 (ref 1.2–3.4)
LYMPHOCYTES NFR BLD: 28.6 % (ref 20–51)
MCH RBC QN AUTO: 28 PG (ref 27–31)
MCHC RBC AUTO-ENTMCNC: 31 G/DL (ref 33–37)
MCV RBC AUTO: 90 FL (ref 80–100)
MONOCYTES # BLD: 0.6 K/MM3 (ref 0.1–0.6)
MONOCYTES NFR BLD AUTO: 7.8 % (ref 1.7–9.3)
NEUTROPHILS # BLD: 4.6 K/MM3 (ref 1.4–6.5)
PLATELET # BLD AUTO: 221 K/MM3 (ref 130–400)
PMV BLD AUTO: 9.7 FL (ref 7.4–10.4)
POTASSIUM SERPL-SCNC: 4.5 MMOL/L (ref 3.5–4.5)
PROT SERPL-MCNC: 9 GM/DL (ref 6.2–8.1)
RBC # BLD AUTO: 3.56 M/MM3 (ref 4.2–5.6)
SODIUM SERPL-SCNC: 137 MMOL/L (ref 136–145)

## 2022-02-24 NOTE — NUR
met with patient to discuss discharge plan. Patient lives at
home alone in [place]. Prior to getting sick, he reports to being independent
with all of his ADL's and does not utilize any DME to assist with mobility.
Patient had no oxygen needs prior. PCP is Dr. Clark and he utilizes [facility] for medications with no cost difficulty. Patient does not have a DPOA-HC
established and states " we should hold off on that one". Informed him that
when he got really sick we had to track his siblings down to make medical
decisions on his behalf. Agreed upon adressing topic again before dc.

## 2022-02-24 NOTE — NUR
Report recieved from NATAN Walsh. Patient being taken down for barium swallow
study upon entry to room. Patient denied any pain, discomfort, SOA, or further
needs at this this time. Patient cleared by  for general diet and regular
liqiuds. Call light in reach. Fall percautions in place.

## 2022-02-24 NOTE — NUR
Patient has had an ok day. Denies any pain, discomfort, SOA, or further needs
at this time. VSS. Patient A&O. Niece at the bedside. Call light in reach.
Fall percautions in place.

## 2022-02-24 NOTE — NUR
RECEIVED CHANGE OF SHIFT REPORT FROM DAY SHIFT RN. UP IN CHAIR WITH EXIT ALARM
ON. CALL LIGHT WITHIN REACH.

## 2022-02-25 VITALS — HEART RATE: 88 BPM | SYSTOLIC BLOOD PRESSURE: 124 MMHG | DIASTOLIC BLOOD PRESSURE: 77 MMHG | TEMPERATURE: 97.7 F

## 2022-02-25 VITALS — TEMPERATURE: 98.8 F | DIASTOLIC BLOOD PRESSURE: 83 MMHG | SYSTOLIC BLOOD PRESSURE: 123 MMHG | HEART RATE: 92 BPM

## 2022-02-25 VITALS — DIASTOLIC BLOOD PRESSURE: 74 MMHG | SYSTOLIC BLOOD PRESSURE: 125 MMHG

## 2022-02-25 NOTE — NUR
Social work contacted patient's niece Chantel and sister Christina ([phone])
to arrange family meeting for Monday 02/28 @ 0930. Christina plans to attend
meeting in person and Chantel plans to attend by phone. The patient's brother
Tyrell is in the process of making the patient's home more accessible for
him. Chantel states that the patient's brother will also want to attend by
phone.
Corry, [facility] director updated with the above information.

## 2022-02-26 VITALS — HEART RATE: 90 BPM | SYSTOLIC BLOOD PRESSURE: 128 MMHG | TEMPERATURE: 98.7 F | DIASTOLIC BLOOD PRESSURE: 56 MMHG

## 2022-02-26 VITALS — TEMPERATURE: 97.8 F | HEART RATE: 81 BPM | DIASTOLIC BLOOD PRESSURE: 69 MMHG | SYSTOLIC BLOOD PRESSURE: 137 MMHG

## 2022-02-26 NOTE — NUR
Patient is pleasant and was compliant with all care to include all therapies.
Patient is resting comfortably in recliner and denies pain at this time. Call
light and bedside table are wityhin reach. Will continue to monitor patient
throughout shift.

## 2022-02-26 NOTE — NUR
Patient is up in chair and denies pain at this time. Call light and bedside
table are within reach. Will continue to monitor patient throughout shift.

## 2022-02-26 NOTE — NUR
Patient awake during 0300 Ancef administration. Requested to get out of bed
and sit in recliner. Patient also requested ice water and apple juice to
drink. This nurse assisted patient to the chair. Patient received requested
oral fluids and is tolerating them well. No complaints expressed at this
time. Call light within reach.

## 2022-02-27 VITALS — SYSTOLIC BLOOD PRESSURE: 148 MMHG | HEART RATE: 91 BPM | DIASTOLIC BLOOD PRESSURE: 81 MMHG | TEMPERATURE: 97.5 F

## 2022-02-27 VITALS — DIASTOLIC BLOOD PRESSURE: 63 MMHG | SYSTOLIC BLOOD PRESSURE: 126 MMHG

## 2022-02-27 VITALS — TEMPERATURE: 98.6 F | DIASTOLIC BLOOD PRESSURE: 77 MMHG | HEART RATE: 92 BPM | SYSTOLIC BLOOD PRESSURE: 125 MMHG

## 2022-02-27 NOTE — NUR
PATIENT SLEEPING, BREATHING NONLABORED AND EVEN. DOES NOT WAKE WHEN NURSING
ENTER ROOM, AWAKENS EASILY WHEN NAME CALLED OR TO LIGHT TOUCH STIMULI. EXIT
ALARM ON WHEN IN BED. CALL LIGHT WITHIN REACH.

## 2022-02-27 NOTE — NUR
PATIENT ALERT AND ORIENTED. DENIES PAIN CONTROL NEEDS. SCHEDULED MEDS
ADMINSTERED PO. PEG TUBE FLUSHED WITH 30 MLS. PICC FLUSHED WITH GOOD BLOOD
RETURN. STANDBY ASSIST WITHOUT ISSUE. RESTING COMFORTABLY IN BED. CALL LIGHT
WITHIN REACH.

## 2022-02-27 NOTE — NUR
Patient is resting comfortably in the recliner and denies pain at this time.
Call light and bedside table are within reach. Will continue to monitor
patient throughout shift.

## 2022-02-27 NOTE — NUR
Patient is resting in recliner and he denies pain at this time. Call light and
bedside table are within reach.

## 2022-02-28 VITALS — HEART RATE: 90 BPM | TEMPERATURE: 98.1 F | SYSTOLIC BLOOD PRESSURE: 123 MMHG | DIASTOLIC BLOOD PRESSURE: 70 MMHG

## 2022-02-28 VITALS — HEART RATE: 80 BPM | TEMPERATURE: 98.3 F | SYSTOLIC BLOOD PRESSURE: 122 MMHG | DIASTOLIC BLOOD PRESSURE: 68 MMHG

## 2022-02-28 NOTE — NUR
and SW Student attended family meeting which included patient's
sister, Christina in person as well as his sister, Chantel (ph#[phone]) by
phone. IPR Director opened the meeting and then [facility] Physician and therapy team
reviewed patient's progress and discharge recommendations. NIYA reviewed
tentative discharge date of Wednesday, 03/02/22. Patient will need outpatient
PT as well as outpatient IV antibiotics three times a day, Q8H. Patient
would like outpatient PT and IV antibiotics set up at [facility]. NIYA
reviewed option for home antibiotics, however patient's family advised they
can assist with transportation and feel going to [facility] for outpatient IV
antibiotics would be better for patient.
 
NIYA contacted [facility] and was advised they can do the outpatient
antibiotics. NIYA faxed initial referral.

## 2022-02-28 NOTE — NUR
Received report from day shift. Patient here for post Covid PNA. Patient alert
and oriented. VSS. Assessment performed. Patient denies pain at this time.
Patient given PM meds. Patient is resting in chair with call light near.

## 2022-02-28 NOTE — NUR
PICC DRESSING CHANGED BY [facility] THERAPY NURSE.
 
PATIENT IS ALSO NOW INDEPENDENT IN ROOM AFTER MEETING WITH THERAPY THIS
MORNING. PATIENT HAS DONE WELL TODAY AND IS ANTICIPATING DISCHARGE ON
WEDNESDAY.

## 2022-02-28 NOTE — NUR
Admission QIM scores were reviewed by the team.  Code of 6 chosen for eating
was determined by team discussion to be the most usual performance for this
patient during the assessment period.  Code of 4 chosen for oral hygiene was
determined by team discussion to be the most usual performance for this
patient during the assessment period.  Code of 4 chosen for toilet hygiene was
determined by team discussion to be the most usual performance for this
patient during the assessment period.  Code of 4 chosen for sit to lying was
determined by team discussion to be the most usual performance before
interventions for this patient during the assessment period.   Code of 4
chosen for lying to sitting on side of bed was determined by team discussion
to be the most usual performance for this patient during the assessment
period.--Corry Brown, VISHAL

## 2022-02-28 NOTE — NUR
PATIENT IS SITTING IN CHAIR, RESTING. PATIENT WORKED WELL WITH THERAPY TODAY.
PATIENT HAD NO PAIN TODAY AND WAS GIVEN NO PAIN MEDICATION. PATIENT TOOK ALL
OTHER MEDICATIONS WITHOUT INCIDENT. PATIENT % OF ALL MEALS AND
TOLERATED WELL. PATIENT WAS MADE INDEPENDENT IN ROOM THIS AM BY THERAPY AND
ALARMS WERE REMOVED. PATIENT'S PEG TUBE IS SUPPOSED TO BE REMOVED BY SURGEON
TOMORROW IN PREPARATION FOR HIS DISCHARGE HOME ON WEDNESDAY.

## 2022-02-28 NOTE — NUR
pATIENT ADMITTED WITH THE MIDLINE.  dRESSING DATED 2/17/22.Sterile dressing
change performed.  Site was cleansed with ChloraPrep x1, chlorhexidine
impregnated disc applied, skin prep, StatLock, and Tegaderm applied.  No signs
or symptoms of IV complications noted.  No concerns voiced.

## 2022-03-01 VITALS — TEMPERATURE: 98.1 F | DIASTOLIC BLOOD PRESSURE: 78 MMHG | HEART RATE: 90 BPM | SYSTOLIC BLOOD PRESSURE: 134 MMHG

## 2022-03-01 VITALS — TEMPERATURE: 98.3 F | HEART RATE: 91 BPM | DIASTOLIC BLOOD PRESSURE: 62 MMHG | SYSTOLIC BLOOD PRESSURE: 123 MMHG

## 2022-03-01 NOTE — NUR
Pt. sitting up in chair. Pt. is A&OX3, assessment complete. PICC to rt. upper
arm patent.  Pt. independent in room.  Pt. denies pain or other needs, call
light within reach.

## 2022-03-01 NOTE — NUR
met with patient, who is set to discharge home tomorrow with
outpatient PT and outpatient IV antibiotics. SW presented and reviewed IM form
with patient who verbalized understanding and provided signature. SW placed
form in chart and provided copy to patient.
 
NIYA spoke with Pati at [facility] who advised they have reviewed
referral and will schedule patient once they receive orders tomorrow.
 
NIYA contacted patient's sister, Christina and provided update.

## 2022-03-02 VITALS — DIASTOLIC BLOOD PRESSURE: 76 MMHG | SYSTOLIC BLOOD PRESSURE: 133 MMHG

## 2022-03-02 VITALS — TEMPERATURE: 98.2 F | DIASTOLIC BLOOD PRESSURE: 75 MMHG | HEART RATE: 83 BPM | SYSTOLIC BLOOD PRESSURE: 127 MMHG

## 2022-03-02 LAB
ANION GAP SERPL CALC-SCNC: 12 MMOL/L (ref 7–16)
BASOPHILS # BLD: 0 K/MM3 (ref 0–0.2)
BASOPHILS NFR BLD AUTO: 0.2 % (ref 0–2)
BUN SERPL-MCNC: 8 MG/DL (ref 8–26)
CALCIUM SERPL-MCNC: 8.9 MG/DL (ref 8.4–10.2)
CHLORIDE SERPL-SCNC: 102 MMOL/L (ref 98–107)
CO2 SERPL-SCNC: 24 MMOL/L (ref 22–29)
CREAT SERPL-SCNC: 0.77 MG/DL (ref 0.72–1.25)
EOSINOPHIL # BLD: 0.2 K/MM3 (ref 0–0.7)
EOSINOPHIL NFR BLD: 3 % (ref 0–4)
ERYTHROCYTE [DISTWIDTH] IN BLOOD BY AUTOMATED COUNT: 17.5 % (ref 11.5–14.5)
GLUCOSE SERPL-MCNC: 77 MG/DL (ref 70–99)
GRANULOCYTES # BLD AUTO: 50.1 % (ref 42.2–75.2)
HCT VFR BLD AUTO: 30.2 % (ref 42–52)
HGB BLD-MCNC: 9.1 G/DL (ref 13.5–18)
LYMPHOCYTES # BLD: 2 K/MM3 (ref 1.2–3.4)
LYMPHOCYTES NFR BLD: 37.7 % (ref 20–51)
MCH RBC QN AUTO: 28 PG (ref 27–31)
MCHC RBC AUTO-ENTMCNC: 30 G/DL (ref 33–37)
MCV RBC AUTO: 92 FL (ref 80–100)
MONOCYTES # BLD: 0.5 K/MM3 (ref 0.1–0.6)
MONOCYTES NFR BLD AUTO: 8.6 % (ref 1.7–9.3)
NEUTROPHILS # BLD: 2.6 K/MM3 (ref 1.4–6.5)
PLATELET # BLD AUTO: 205 K/MM3 (ref 130–400)
PMV BLD AUTO: 9.5 FL (ref 7.4–10.4)
POTASSIUM SERPL-SCNC: 4.2 MMOL/L (ref 3.5–4.5)
RBC # BLD AUTO: 3.27 M/MM3 (ref 4.2–5.6)
SODIUM SERPL-SCNC: 138 MMOL/L (ref 136–145)

## 2022-03-02 NOTE — NUR
Discharge orders, medications and appointments have been printed and reviewed
with patient and family. Importance stressed on keeping follow up appointments
and/or calling to reschedule. Patient denied having questions about discharge
or appointments. Belongings were gathered by patient and nephew to include
cell phone and . Pt was transported to car via wheelchair and
seatbelted in car for ride home.

## 2022-03-02 NOTE — NUR
contacted [facility] PT ([facility]) and scheduled
patient's first PT appointment for 03/08/22 at 1000. SW faxed referral and
discharge orders.
 
SW collaborated with RN, Pharmacy, patient, patient's family, and [facility] to schedule outpatient antibiotic appointments. Patient will have a
noon dose today prior to discharge, then will have his first appointment at
[facility] at 9:00pm. Patient then will start a regular schedule
tomorrow of 6:00am/2:00pm/10:00pm. Patient and patient's sister, Christina are
agreeable to these times. RN included appointment times in patient's discharge
instructions.

## 2022-03-02 NOTE — NUR
Patient up to bedside in chair. Call light within reach. Previous trach site
covered with occulusive dsg - clean, dry, intact. He denies pain. He is
anticipating PEG tube removal today and possible discharge from [facility] today. He
denies further needs at this time

## 2022-03-02 NOTE — NUR
Patient sitting in bedside chair. Denies pain or discomfort at this time.
Call light is within his reach. Denies further needs at this time

## 2022-03-03 VITALS — SYSTOLIC BLOOD PRESSURE: 109 MMHG | DIASTOLIC BLOOD PRESSURE: 47 MMHG

## 2022-03-04 ENCOUNTER — HOSPITAL ENCOUNTER (OUTPATIENT)
Dept: HOSPITAL 19 - EUO | Age: 52
Discharge: HOME | End: 2022-03-04
Payer: MEDICARE

## 2022-03-04 VITALS — DIASTOLIC BLOOD PRESSURE: 86 MMHG | SYSTOLIC BLOOD PRESSURE: 154 MMHG

## 2022-03-04 VITALS — HEART RATE: 97 BPM | TEMPERATURE: 97.7 F | SYSTOLIC BLOOD PRESSURE: 125 MMHG | DIASTOLIC BLOOD PRESSURE: 81 MMHG

## 2022-03-04 VITALS — DIASTOLIC BLOOD PRESSURE: 60 MMHG | SYSTOLIC BLOOD PRESSURE: 123 MMHG

## 2022-03-04 DIAGNOSIS — U09.9: ICD-10-CM

## 2022-03-04 DIAGNOSIS — J18.9: Primary | ICD-10-CM

## 2022-03-04 PROCEDURE — C1751 CATH, INF, PER/CENT/MIDLINE: HCPCS

## 2022-03-05 VITALS — DIASTOLIC BLOOD PRESSURE: 69 MMHG | SYSTOLIC BLOOD PRESSURE: 114 MMHG

## 2022-03-05 VITALS — DIASTOLIC BLOOD PRESSURE: 76 MMHG | SYSTOLIC BLOOD PRESSURE: 115 MMHG

## 2022-03-05 VITALS — DIASTOLIC BLOOD PRESSURE: 83 MMHG | SYSTOLIC BLOOD PRESSURE: 122 MMHG

## 2022-03-06 VITALS — SYSTOLIC BLOOD PRESSURE: 130 MMHG | DIASTOLIC BLOOD PRESSURE: 78 MMHG

## 2022-03-06 VITALS — SYSTOLIC BLOOD PRESSURE: 117 MMHG | DIASTOLIC BLOOD PRESSURE: 62 MMHG

## 2022-03-06 VITALS — SYSTOLIC BLOOD PRESSURE: 113 MMHG | DIASTOLIC BLOOD PRESSURE: 75 MMHG

## 2022-03-07 VITALS — DIASTOLIC BLOOD PRESSURE: 80 MMHG | SYSTOLIC BLOOD PRESSURE: 133 MMHG

## 2022-03-07 VITALS — DIASTOLIC BLOOD PRESSURE: 81 MMHG | SYSTOLIC BLOOD PRESSURE: 123 MMHG

## 2022-03-07 VITALS — DIASTOLIC BLOOD PRESSURE: 44 MMHG | SYSTOLIC BLOOD PRESSURE: 102 MMHG

## 2022-03-08 ENCOUNTER — HOSPITAL ENCOUNTER (OUTPATIENT)
Dept: HOSPITAL 6 - RAD | Age: 52
End: 2022-03-08
Attending: FAMILY MEDICINE
Payer: MEDICARE

## 2022-03-08 ENCOUNTER — HOSPITAL ENCOUNTER (OUTPATIENT)
Dept: HOSPITAL 6 - PT | Age: 52
LOS: 23 days | Discharge: HOME | End: 2022-03-31
Attending: FAMILY MEDICINE
Payer: MEDICARE

## 2022-03-08 VITALS — DIASTOLIC BLOOD PRESSURE: 82 MMHG | SYSTOLIC BLOOD PRESSURE: 135 MMHG

## 2022-03-08 VITALS — SYSTOLIC BLOOD PRESSURE: 127 MMHG | DIASTOLIC BLOOD PRESSURE: 78 MMHG

## 2022-03-08 VITALS — SYSTOLIC BLOOD PRESSURE: 123 MMHG | DIASTOLIC BLOOD PRESSURE: 80 MMHG

## 2022-03-08 DIAGNOSIS — G93.41: Primary | ICD-10-CM

## 2022-03-08 DIAGNOSIS — U09.9: ICD-10-CM

## 2022-03-08 DIAGNOSIS — R91.1: Primary | ICD-10-CM

## 2022-03-08 DIAGNOSIS — R53.1: ICD-10-CM

## 2022-03-09 VITALS — DIASTOLIC BLOOD PRESSURE: 83 MMHG | SYSTOLIC BLOOD PRESSURE: 157 MMHG

## 2022-03-09 VITALS — DIASTOLIC BLOOD PRESSURE: 79 MMHG | SYSTOLIC BLOOD PRESSURE: 122 MMHG

## 2022-03-09 VITALS — SYSTOLIC BLOOD PRESSURE: 115 MMHG | DIASTOLIC BLOOD PRESSURE: 76 MMHG

## 2022-03-10 VITALS — SYSTOLIC BLOOD PRESSURE: 133 MMHG | DIASTOLIC BLOOD PRESSURE: 84 MMHG

## 2022-03-10 VITALS — DIASTOLIC BLOOD PRESSURE: 87 MMHG | SYSTOLIC BLOOD PRESSURE: 135 MMHG

## 2022-03-10 VITALS — SYSTOLIC BLOOD PRESSURE: 134 MMHG | DIASTOLIC BLOOD PRESSURE: 79 MMHG

## 2022-03-11 VITALS — SYSTOLIC BLOOD PRESSURE: 117 MMHG | DIASTOLIC BLOOD PRESSURE: 81 MMHG

## 2022-03-11 VITALS — DIASTOLIC BLOOD PRESSURE: 84 MMHG | SYSTOLIC BLOOD PRESSURE: 126 MMHG

## 2022-03-11 VITALS — DIASTOLIC BLOOD PRESSURE: 80 MMHG | SYSTOLIC BLOOD PRESSURE: 120 MMHG

## 2022-03-11 NOTE — NUR
PT ASSISTED SBA TO BR THEN TO RECLINER PER REQUEST. DENIES PAIN. TF CONTINUES
PER PEG AT 50CC/HR. CALL LIGHT IN REACH. headache/DECREASED EATING/DRINKING/PAIN/VOMITING

## 2022-03-12 VITALS — DIASTOLIC BLOOD PRESSURE: 77 MMHG | SYSTOLIC BLOOD PRESSURE: 121 MMHG

## 2022-03-12 VITALS — SYSTOLIC BLOOD PRESSURE: 138 MMHG | DIASTOLIC BLOOD PRESSURE: 94 MMHG

## 2022-03-12 VITALS — SYSTOLIC BLOOD PRESSURE: 132 MMHG | DIASTOLIC BLOOD PRESSURE: 84 MMHG

## 2022-03-13 VITALS — SYSTOLIC BLOOD PRESSURE: 131 MMHG | DIASTOLIC BLOOD PRESSURE: 84 MMHG

## 2022-03-13 VITALS — DIASTOLIC BLOOD PRESSURE: 76 MMHG | SYSTOLIC BLOOD PRESSURE: 124 MMHG

## 2022-03-13 VITALS — DIASTOLIC BLOOD PRESSURE: 84 MMHG | SYSTOLIC BLOOD PRESSURE: 138 MMHG

## 2022-03-14 VITALS — DIASTOLIC BLOOD PRESSURE: 81 MMHG | SYSTOLIC BLOOD PRESSURE: 127 MMHG

## 2022-03-14 VITALS — SYSTOLIC BLOOD PRESSURE: 124 MMHG | DIASTOLIC BLOOD PRESSURE: 81 MMHG

## 2022-03-14 VITALS — DIASTOLIC BLOOD PRESSURE: 78 MMHG | SYSTOLIC BLOOD PRESSURE: 126 MMHG

## 2022-03-15 VITALS — SYSTOLIC BLOOD PRESSURE: 129 MMHG | DIASTOLIC BLOOD PRESSURE: 85 MMHG

## 2022-03-15 VITALS — SYSTOLIC BLOOD PRESSURE: 118 MMHG | DIASTOLIC BLOOD PRESSURE: 7 MMHG

## 2022-03-15 VITALS — DIASTOLIC BLOOD PRESSURE: 83 MMHG | SYSTOLIC BLOOD PRESSURE: 120 MMHG

## 2022-03-16 ENCOUNTER — HOSPITAL ENCOUNTER (OUTPATIENT)
Dept: HOSPITAL 6 - AMSURD | Age: 52
LOS: 15 days | End: 2022-03-31
Attending: INTERNAL MEDICINE
Payer: MEDICARE

## 2022-03-16 VITALS — BODY MASS INDEX: 46.65 KG/M2 | WEIGHT: 315 LBS | HEIGHT: 69.02 IN

## 2022-03-16 VITALS — SYSTOLIC BLOOD PRESSURE: 104 MMHG | DIASTOLIC BLOOD PRESSURE: 74 MMHG

## 2022-03-16 DIAGNOSIS — Z79.899: Primary | ICD-10-CM

## 2022-03-18 ENCOUNTER — HOSPITAL ENCOUNTER (OUTPATIENT)
Dept: HOSPITAL 6 - AMSURD | Age: 52
End: 2022-03-18
Payer: MEDICARE

## 2022-03-18 VITALS — DIASTOLIC BLOOD PRESSURE: 78 MMHG | SYSTOLIC BLOOD PRESSURE: 122 MMHG

## 2022-03-18 VITALS — HEIGHT: 69.02 IN | WEIGHT: 315 LBS | BODY MASS INDEX: 46.65 KG/M2

## 2022-03-18 DIAGNOSIS — Z45.2: Primary | ICD-10-CM

## 2022-04-05 ENCOUNTER — HOSPITAL ENCOUNTER (OUTPATIENT)
Dept: HOSPITAL 6 - PT | Age: 52
LOS: 25 days | Discharge: HOME | End: 2022-04-30
Attending: FAMILY MEDICINE
Payer: MEDICARE

## 2022-04-05 DIAGNOSIS — G93.41: Primary | ICD-10-CM

## 2022-04-05 DIAGNOSIS — Z86.16: ICD-10-CM

## 2022-05-05 ENCOUNTER — HOSPITAL ENCOUNTER (OUTPATIENT)
Dept: HOSPITAL 6 - PT | Age: 52
LOS: 26 days | Discharge: STILL ADMITTED OR ADMINISTRATIVE | End: 2022-05-31
Attending: FAMILY MEDICINE
Payer: MEDICARE

## 2022-05-05 DIAGNOSIS — G93.41: Primary | ICD-10-CM

## 2022-05-05 DIAGNOSIS — R53.1: ICD-10-CM

## 2022-05-05 DIAGNOSIS — J12.82: ICD-10-CM

## 2022-05-05 DIAGNOSIS — U09.9: ICD-10-CM

## 2024-09-02 ENCOUNTER — HOSPITAL ENCOUNTER (EMERGENCY)
Dept: HOSPITAL 6 - ED | Age: 54
LOS: 1 days | Discharge: HOME | End: 2024-09-03
Payer: MEDICARE

## 2024-09-02 VITALS — WEIGHT: 315 LBS | BODY MASS INDEX: 46.65 KG/M2 | HEIGHT: 69.02 IN

## 2024-09-02 DIAGNOSIS — F12.929: Primary | ICD-10-CM

## 2024-09-02 LAB
ALBUMIN SERPL-MCNC: 3.9 G/DL (ref 3.5–5)
ALT SERPL-CCNC: 21 U/L (ref 0–55)
APPEARANCE UR: CLEAR
AST SERPL-CCNC: 27 U/L (ref 5–34)
BASOPHILS # BLD: 0.01 K/MM3 (ref 0.02–0.1)
BILIRUB SERPL-MCNC: 0.2 MG/DL (ref 0.2–1.2)
BILIRUB UR QL STRIP.AUTO: NEGATIVE
CALCIUM SERPL-MCNC: 9.2 MG/DL (ref 8.3–10.5)
CO2 SERPL-SCNC: 23 MMOL/L (ref 22–29)
COLOR UR AUTO: YELLOW
EOSINOPHIL # BLD: 0.03 K/MM3 (ref 0.04–0.4)
EOSINOPHIL NFR BLD: 0.4 % (ref 0–4)
ERYTHROCYTE [DISTWIDTH] IN BLOOD BY AUTOMATED COUNT: 13.7 % (ref 11.5–14.5)
ETHANOL SERPL-MCNC: < 10 MG/DL (ref ?–10)
GLUCOSE SERPL-MCNC: 144 MG/DL (ref 75–110)
GLUCOSE UR QL STRIP.AUTO: NEGATIVE
HCT VFR BLD AUTO: 41.1 % (ref 42–52)
HGB BLD-MCNC: 13.3 G/DL (ref 13.5–18)
KETONES UR QL STRIP.AUTO: NEGATIVE
LEUKOCYTE ESTERASE UR QL STRIP: NEGATIVE
LYMPHOCYTES # BLD: 1.45 K/MM3 (ref 1.5–4)
MCH RBC QN AUTO: 28 PG (ref 27–31)
MCHC RBC AUTO-ENTMCNC: 32 G/DL (ref 33–37)
MCV RBC AUTO: 88 FL (ref 78–100)
MONOCYTES # BLD: 0.55 K/MM3 (ref 0.2–0.8)
MUCOUS THREADS URNS QL MICRO: PRESENT
NEUTROPHILS # BLD: 6.48 K/MM3 (ref 1.4–6.5)
NITRITE UR QL STRIP: NEGATIVE
PH UR STRIP.AUTO: 5 [PH] (ref 5–8)
PLATELET # BLD AUTO: 171 K/MM3 (ref 130–400)
PMV BLD AUTO: 9.3 FL (ref 7.4–10.4)
PROT ?TM UR-MCNC: (no result) MG/DL
PROT SERPL-MCNC: 7.5 G/DL (ref 6.4–8.3)
RBC # BLD AUTO: 4.68 M/MM3 (ref 4.2–5.6)
RBC UR QL AUTO: NEGATIVE
SODIUM SERPL-SCNC: 137 MMOL/L (ref 136–145)
SP GR UR STRIP.AUTO: >=1.03 (ref 1–1.03)
WBC # BLD AUTO: 8.6 K/MM3 (ref 4.8–10.8)
WBC #/AREA URNS HPF: 0 /HPF (ref 0–3)

## 2024-09-03 VITALS — DIASTOLIC BLOOD PRESSURE: 61 MMHG | SYSTOLIC BLOOD PRESSURE: 102 MMHG
